# Patient Record
Sex: MALE | Race: BLACK OR AFRICAN AMERICAN | NOT HISPANIC OR LATINO | Employment: UNEMPLOYED | RURAL
[De-identification: names, ages, dates, MRNs, and addresses within clinical notes are randomized per-mention and may not be internally consistent; named-entity substitution may affect disease eponyms.]

---

## 2020-09-01 ENCOUNTER — HISTORICAL (OUTPATIENT)
Dept: ADMINISTRATIVE | Facility: HOSPITAL | Age: 65
End: 2020-09-01

## 2020-11-18 ENCOUNTER — HISTORICAL (OUTPATIENT)
Dept: ADMINISTRATIVE | Facility: HOSPITAL | Age: 65
End: 2020-11-18

## 2020-11-18 LAB
ALBUMIN SERPL BCP-MCNC: 3.9 G/DL (ref 3.5–5)
ALBUMIN/GLOB SERPL: 1.1 {RATIO}
ALP SERPL-CCNC: 47 U/L (ref 45–115)
ALT SERPL W P-5'-P-CCNC: 71 U/L (ref 16–61)
ANION GAP SERPL CALCULATED.3IONS-SCNC: 8.9 MMOL/L (ref 7–16)
AST SERPL W P-5'-P-CCNC: 40 U/L (ref 15–37)
BILIRUB SERPL-MCNC: 0.5 MG/DL (ref 0–1.2)
BUN SERPL-MCNC: 19 MG/DL (ref 7–18)
BUN/CREAT SERPL: 14
CALCIUM SERPL-MCNC: 10 MG/DL (ref 8.5–10.1)
CHLORIDE SERPL-SCNC: 108 MMOL/L (ref 98–107)
CO2 SERPL-SCNC: 28 MMOL/L (ref 21–32)
CREAT SERPL-MCNC: 1.32 MG/DL (ref 0.7–1.3)
GLOBULIN SER-MCNC: 3.5 G/DL (ref 2–4)
GLUCOSE SERPL-MCNC: 81 MG/DL (ref 74–106)
POTASSIUM SERPL-SCNC: 3.9 MMOL/L (ref 3.5–5.1)
PROT SERPL-MCNC: 7.4 G/DL (ref 6.4–8.2)
SODIUM SERPL-SCNC: 141 MMOL/L (ref 136–145)

## 2021-01-11 ENCOUNTER — HISTORICAL (OUTPATIENT)
Dept: ADMINISTRATIVE | Facility: HOSPITAL | Age: 66
End: 2021-01-11

## 2021-01-11 LAB
ALBUMIN SERPL BCP-MCNC: 3.8 G/DL (ref 3.5–5)
ALBUMIN/GLOB SERPL: 1.1 {RATIO}
ALP SERPL-CCNC: 49 U/L (ref 45–115)
ALT SERPL W P-5'-P-CCNC: 59 U/L (ref 16–61)
ANION GAP SERPL CALCULATED.3IONS-SCNC: 9.7 MMOL/L (ref 7–16)
AST SERPL W P-5'-P-CCNC: 37 U/L (ref 15–37)
BASOPHILS # BLD AUTO: 0.11 X10E3/UL (ref 0–0.2)
BASOPHILS NFR BLD AUTO: 2 % (ref 0–1)
BILIRUB SERPL-MCNC: 0.4 MG/DL (ref 0–1.2)
BUN SERPL-MCNC: 17 MG/DL (ref 7–18)
BUN/CREAT SERPL: 13
CALCIUM SERPL-MCNC: 9.5 MG/DL (ref 8.5–10.1)
CHLORIDE SERPL-SCNC: 109 MMOL/L (ref 98–107)
CHOLEST SERPL-MCNC: 159 MG/DL
CHOLEST/HDLC SERPL: 2.9 {RATIO}
CO2 SERPL-SCNC: 26 MMOL/L (ref 21–32)
CREAT SERPL-MCNC: 1.32 MG/DL (ref 0.7–1.3)
EOSINOPHIL # BLD AUTO: 0.72 X10E3/UL (ref 0–0.5)
EOSINOPHIL NFR BLD AUTO: 13 % (ref 1–4)
ERYTHROCYTE [DISTWIDTH] IN BLOOD BY AUTOMATED COUNT: 14 % (ref 11.5–14.5)
GLOBULIN SER-MCNC: 3.5 G/DL (ref 2–4)
GLUCOSE SERPL-MCNC: 88 MG/DL (ref 74–106)
HCT VFR BLD AUTO: 38.6 % (ref 40–54)
HDLC SERPL-MCNC: 54 MG/DL
HGB BLD-MCNC: 12.8 G/DL (ref 13.5–18)
IMM GRANULOCYTES # BLD AUTO: 0.01 X10E3/UL (ref 0–0.04)
IMM GRANULOCYTES NFR BLD: 0.2 % (ref 0–0.4)
LDLC SERPL CALC-MCNC: 93 MG/DL
LYMPHOCYTES # BLD AUTO: 1.57 X10E3/UL (ref 1–4.8)
LYMPHOCYTES NFR BLD AUTO: 28.4 % (ref 27–41)
MCH RBC QN AUTO: 29.8 PG (ref 27–31)
MCHC RBC AUTO-ENTMCNC: 33.2 G/DL (ref 32–36)
MCV RBC AUTO: 89.8 FL (ref 80–96)
MONOCYTES # BLD AUTO: 0.53 X10E3/UL (ref 0–0.8)
MONOCYTES NFR BLD AUTO: 9.6 % (ref 2–6)
MPC BLD CALC-MCNC: 12 FL (ref 9.4–12.4)
NEUTROPHILS # BLD AUTO: 2.58 X10E3/UL (ref 1.8–7.7)
NEUTROPHILS NFR BLD AUTO: 46.8 % (ref 53–65)
NRBC # BLD AUTO: 0 X10E3/UL (ref 0–0)
NRBC, AUTO (.00): 0 /100 (ref 0–0)
PLATELET # BLD AUTO: 262 X10E3/UL (ref 150–400)
POTASSIUM SERPL-SCNC: 3.7 MMOL/L (ref 3.5–5.1)
PROT SERPL-MCNC: 7.3 G/DL (ref 6.4–8.2)
RBC # BLD AUTO: 4.3 X10E6/UL (ref 4.6–6.2)
SODIUM SERPL-SCNC: 141 MMOL/L (ref 136–145)
TRIGL SERPL-MCNC: 60 MG/DL
TSH SERPL DL<=0.005 MIU/L-ACNC: 1.22 UIU/ML (ref 0.36–3.74)
WBC # BLD AUTO: 5.52 X10E3/UL (ref 4.5–11)

## 2021-03-09 ENCOUNTER — HISTORICAL (OUTPATIENT)
Dept: ADMINISTRATIVE | Facility: HOSPITAL | Age: 66
End: 2021-03-09

## 2021-03-09 LAB
ANION GAP SERPL CALCULATED.3IONS-SCNC: 9 MMOL/L (ref 7–16)
BUN SERPL-MCNC: 16 MG/DL (ref 7–18)
CALCIUM SERPL-MCNC: 9.7 MG/DL (ref 8.5–10.1)
CHLORIDE SERPL-SCNC: 108 MMOL/L (ref 98–107)
CO2 SERPL-SCNC: 28 MMOL/L (ref 21–32)
CREAT SERPL-MCNC: 1.39 MG/DL (ref 0.7–1.3)
GLUCOSE SERPL-MCNC: 86 MG/DL (ref 74–106)
POTASSIUM SERPL-SCNC: 3.8 MMOL/L (ref 3.5–5.1)
SODIUM SERPL-SCNC: 141 MMOL/L (ref 136–145)

## 2022-05-11 DIAGNOSIS — Z12.11 COLON CANCER SCREENING: Primary | ICD-10-CM

## 2022-06-23 ENCOUNTER — ANESTHESIA EVENT (OUTPATIENT)
Dept: GASTROENTEROLOGY | Facility: HOSPITAL | Age: 67
End: 2022-06-23
Payer: MEDICARE

## 2022-06-23 ENCOUNTER — HOSPITAL ENCOUNTER (OUTPATIENT)
Dept: GASTROENTEROLOGY | Facility: HOSPITAL | Age: 67
Discharge: HOME | End: 2022-06-23
Attending: INTERNAL MEDICINE
Payer: MEDICARE

## 2022-06-23 ENCOUNTER — ANESTHESIA (OUTPATIENT)
Dept: GASTROENTEROLOGY | Facility: HOSPITAL | Age: 67
End: 2022-06-23
Payer: MEDICARE

## 2022-06-23 VITALS
TEMPERATURE: 98 F | HEART RATE: 43 BPM | RESPIRATION RATE: 18 BRPM | SYSTOLIC BLOOD PRESSURE: 103 MMHG | BODY MASS INDEX: 29.23 KG/M2 | HEIGHT: 63 IN | OXYGEN SATURATION: 98 % | DIASTOLIC BLOOD PRESSURE: 50 MMHG | WEIGHT: 165 LBS

## 2022-06-23 DIAGNOSIS — Z86.010 HISTORY OF COLON POLYPS: ICD-10-CM

## 2022-06-23 DIAGNOSIS — Z12.11 COLON CANCER SCREENING: ICD-10-CM

## 2022-06-23 DIAGNOSIS — Z12.11 SCREENING FOR COLON CANCER: ICD-10-CM

## 2022-06-23 DIAGNOSIS — K62.1 POLYP OF RECTUM: ICD-10-CM

## 2022-06-23 DIAGNOSIS — K57.30 COLON, DIVERTICULOSIS: ICD-10-CM

## 2022-06-23 DIAGNOSIS — D12.3 ADENOMATOUS POLYP OF TRANSVERSE COLON: ICD-10-CM

## 2022-06-23 PROBLEM — Z86.0100 HISTORY OF COLON POLYPS: Status: ACTIVE | Noted: 2022-06-23

## 2022-06-23 PROCEDURE — 45380 COLONOSCOPY AND BIOPSY: CPT | Mod: PT

## 2022-06-23 PROCEDURE — 25000003 PHARM REV CODE 250: Performed by: NURSE ANESTHETIST, CERTIFIED REGISTERED

## 2022-06-23 PROCEDURE — 37000009 HC ANESTHESIA EA ADD 15 MINS

## 2022-06-23 PROCEDURE — C1889 IMPLANT/INSERT DEVICE, NOC: HCPCS

## 2022-06-23 PROCEDURE — D9220A PRA ANESTHESIA: ICD-10-PCS | Mod: PT,,, | Performed by: NURSE ANESTHETIST, CERTIFIED REGISTERED

## 2022-06-23 PROCEDURE — D9220A PRA ANESTHESIA: Mod: PT,,, | Performed by: NURSE ANESTHETIST, CERTIFIED REGISTERED

## 2022-06-23 PROCEDURE — 37000008 HC ANESTHESIA 1ST 15 MINUTES

## 2022-06-23 PROCEDURE — 27201423 OPTIME MED/SURG SUP & DEVICES STERILE SUPPLY

## 2022-06-23 PROCEDURE — 63600175 PHARM REV CODE 636 W HCPCS: Performed by: NURSE ANESTHETIST, CERTIFIED REGISTERED

## 2022-06-23 RX ORDER — LOSARTAN POTASSIUM AND HYDROCHLOROTHIAZIDE 12.5; 1 MG/1; MG/1
1 TABLET ORAL DAILY
COMMUNITY

## 2022-06-23 RX ORDER — PROPOFOL 10 MG/ML
VIAL (ML) INTRAVENOUS
Status: DISCONTINUED | OUTPATIENT
Start: 2022-06-23 | End: 2022-06-23

## 2022-06-23 RX ORDER — LIDOCAINE HYDROCHLORIDE 20 MG/ML
INJECTION, SOLUTION EPIDURAL; INFILTRATION; INTRACAUDAL; PERINEURAL
Status: DISCONTINUED | OUTPATIENT
Start: 2022-06-23 | End: 2022-06-23

## 2022-06-23 RX ORDER — ATORVASTATIN CALCIUM 40 MG/1
40 TABLET, FILM COATED ORAL DAILY
COMMUNITY

## 2022-06-23 RX ORDER — OMEPRAZOLE 40 MG/1
40 CAPSULE, DELAYED RELEASE ORAL DAILY
COMMUNITY

## 2022-06-23 RX ORDER — FENOFIBRATE 160 MG/1
145 TABLET ORAL DAILY
COMMUNITY

## 2022-06-23 RX ORDER — PHENYLEPHRINE HYDROCHLORIDE 10 MG/ML
INJECTION INTRAVENOUS
Status: DISCONTINUED | OUTPATIENT
Start: 2022-06-23 | End: 2022-06-23

## 2022-06-23 RX ORDER — SODIUM CHLORIDE 0.9 % (FLUSH) 0.9 %
10 SYRINGE (ML) INJECTION
Status: DISCONTINUED | OUTPATIENT
Start: 2022-06-23 | End: 2022-06-24 | Stop reason: HOSPADM

## 2022-06-23 RX ORDER — ATENOLOL 50 MG/1
50 TABLET ORAL DAILY
COMMUNITY

## 2022-06-23 RX ADMIN — PROPOFOL 50 MG: 10 INJECTION, EMULSION INTRAVENOUS at 10:06

## 2022-06-23 RX ADMIN — PROPOFOL 30 MG: 10 INJECTION, EMULSION INTRAVENOUS at 10:06

## 2022-06-23 RX ADMIN — PROPOFOL 20 MG: 10 INJECTION, EMULSION INTRAVENOUS at 10:06

## 2022-06-23 RX ADMIN — SODIUM CHLORIDE: 9 INJECTION, SOLUTION INTRAVENOUS at 10:06

## 2022-06-23 RX ADMIN — LIDOCAINE HYDROCHLORIDE 100 MG: 20 INJECTION, SOLUTION EPIDURAL; INFILTRATION; INTRACAUDAL; PERINEURAL at 10:06

## 2022-06-23 RX ADMIN — PHENYLEPHRINE HYDROCHLORIDE 100 MCG: 10 INJECTION INTRAVENOUS at 10:06

## 2022-06-23 NOTE — H&P
[facility]  Gastroenterology  H&P    Patient Name: Vickey Dobbs  MRN: [id number]  Admission Date: 6/23/2022  Code Status: No Order    Attending Provider: Sky Hicks MD  Primary Care Physician: Troy Guerrero MD  Principal Problem:<principal problem not specified>    Subjective:     History of Present Illness: Pt has history of colon polyps. His last colonoscopy was 5 years ago.    Past Medical History:   Diagnosis Date    Arthritis     Cancer     GERD (gastroesophageal reflux disease)     High cholesterol     Hypertension     Prostate cancer        Past Surgical History:   Procedure Laterality Date    PROSTATE SURGERY         Review of patient's allergies indicates:  No Known Allergies  Family History    None       Tobacco Use    Smoking status: Never Smoker    Smokeless tobacco: Current User     Types: Chew   Substance and Sexual Activity    Alcohol use: Never    Drug use: Never    Sexual activity: Not on file     Review of Systems   Respiratory: Negative.    Cardiovascular: Negative.    Gastrointestinal: Negative.      Objective:     Vital Signs (Most Recent):  Temp: 97.5 °F (36.4 °C) (06/23/22 0959)  Pulse: (!) 53 (06/23/22 0959)  Resp: 16 (06/23/22 0959)  BP: 104/60 (06/23/22 0959)  SpO2: 98 % (06/23/22 0959) Vital Signs (24h Range):  Temp:  [97.5 °F (36.4 °C)] 97.5 °F (36.4 °C)  Pulse:  [53] 53  Resp:  [16] 16  SpO2:  [98 %] 98 %  BP: (104)/(60) 104/60     Weight: 74.8 kg (165 lb) (06/23/22 0959)  Body mass index is 29.23 kg/m².    No intake or output data in the 24 hours ending 06/23/22 1039    Lines/Drains/Airways     Peripheral Intravenous Line  Duration                Peripheral IV - Single Lumen 06/23/22 0958 22 G Left Hand <1 day                Physical Exam  Constitutional:       General: He is not in acute distress.     Appearance: Normal appearance. He is well-developed. He is not ill-appearing.   HENT:      Head: Normocephalic and atraumatic.      Nose: Nose normal.    Eyes:      Pupils: Pupils are equal, round, and reactive to light.   Cardiovascular:      Rate and Rhythm: Regular rhythm. Bradycardia present.   Pulmonary:      Effort: Pulmonary effort is normal.      Breath sounds: Normal breath sounds. No wheezing.   Abdominal:      General: Abdomen is flat. Bowel sounds are normal. There is no distension.      Palpations: Abdomen is soft.      Tenderness: There is no abdominal tenderness. There is no guarding.   Skin:     General: Skin is warm and dry.      Coloration: Skin is not jaundiced.   Neurological:      Mental Status: He is alert.   Psychiatric:         Attention and Perception: Attention normal.         Mood and Affect: Affect normal.         Speech: Speech normal.         Behavior: Behavior is cooperative.      Comments: Pt was calm while speaking.         Significant Labs:  CBC: No results for input(s): WBC, HGB, HCT, PLT in the last 48 hours.  CMP: No results for input(s): GLU, CALCIUM, ALBUMIN, PROT, NA, K, CO2, CL, BUN, CREATININE, ALKPHOS, ALT, AST, BILITOT in the last 48 hours.    Significant Imaging:  Imaging results within the past 24 hours have been reviewed.    Assessment/Plan:     There are no hospital problems to display for this patient.        Imp: History of colon polyp  Plan: colonoscopy    Sky Hicks MD  Gastroenterology  [facility]

## 2022-06-23 NOTE — ANESTHESIA POSTPROCEDURE EVALUATION
Anesthesia Post Evaluation    Patient: Vickey Dobbs    Procedure(s) Performed:Colonoscopy    Final Anesthesia Type: general      Patient location during evaluation: GI PACU  Patient participation: Yes- Able to Participate  Level of consciousness: awake and alert  Post-procedure vital signs: reviewed and stable  Pain management: adequate  Airway patency: patent  KAREN mitigation strategies: Multimodal analgesia  PONV status at discharge: No PONV  Anesthetic complications: no      Cardiovascular status: blood pressure returned to baseline  Respiratory status: spontaneous ventilation  Hydration status: euvolemic  Follow-up not needed.          Vitals Value Taken Time   /50 06/23/22 1130   Temp 98.2 06/23/22 1136   Pulse 43 06/23/22 1130   Resp 18 06/23/22 1130   SpO2 98 % 06/23/22 1130         No case tracking events are documented in the log.      Pain/Zeus Score: Zeus Score: 10 (6/23/2022 11:30 AM)

## 2022-06-23 NOTE — ANESTHESIA PREPROCEDURE EVALUATION
06/23/2022  Vickey Dobbs is a 66 y.o., male.      Pre-op Assessment    I have reviewed the Patient Summary Reports.     I have reviewed the Nursing Notes. I have reviewed the NPO Status.      Review of Systems  Anesthesia Hx:  No problems with previous Anesthesia  History of prior surgery of interest to airway management or planning: Denies Family Hx of Anesthesia complications.   Denies Personal Hx of Anesthesia complications.   Social:  Non-Smoker + Smokeless tobacco      Hematology/Oncology:        Current/Recent Cancer. Oncology Comments: Prostate per Epic. Patient denies   Cardiovascular:   Hypertension hyperlipidemia    Hepatic/GI:   Bowel Prep. GERD    Musculoskeletal:   Arthritis         Physical Exam    Airway:  Mallampati: III / II  Mouth Opening: Normal  TM Distance: Normal  Tongue: Normal  Neck ROM: Normal ROM        Anesthesia Plan  Type of Anesthesia, risks & benefits discussed:    Anesthesia Type: Gen Natural Airway  Intra-op Monitoring Plan: Standard ASA Monitors  Post Op Pain Control Plan: multimodal analgesia  Informed Consent: Informed consent signed with the Patient and all parties understand the risks and agree with anesthesia plan.  All questions answered. Patient consented to blood products? Yes  ASA Score: 3  Day of Surgery Review of History & Physical: I have interviewed and examined the patient. I have reviewed the patient's H&P dated: There are no significant changes.     Ready For Surgery From Anesthesia Perspective.     .    Past Medical History:   Diagnosis Date    Arthritis     Cancer     GERD (gastroesophageal reflux disease)     High cholesterol     Hypertension     Prostate cancer        Past Surgical History:   Procedure Laterality Date    PROSTATE SURGERY         History reviewed. No pertinent family history.    Social History     Socioeconomic History     Marital status:    Tobacco Use    Smoking status: Never Smoker    Smokeless tobacco: Current User     Types: Chew   Substance and Sexual Activity    Alcohol use: Never    Drug use: Never       Current Outpatient Medications   Medication Sig Dispense Refill    atenoloL (TENORMIN) 50 MG tablet Take 50 mg by mouth once daily.      atorvastatin (LIPITOR) 40 MG tablet Take 40 mg by mouth once daily.      fenofibrate 160 MG Tab Take 145 mg by mouth once daily.      losartan-hydrochlorothiazide 100-12.5 mg (HYZAAR) 100-12.5 mg Tab Take 1 tablet by mouth once daily.      omeprazole (PRILOSEC) 40 MG capsule Take 40 mg by mouth once daily.       No current facility-administered medications for this encounter.       Review of patient's allergies indicates:  No Known Allergies

## 2022-06-23 NOTE — TRANSFER OF CARE
"Anesthesia Transfer of Care Note    Patient: Vickey Dobbs    Procedure(s) Performed: Colonoscopy    Patient location: GI    Anesthesia Type: general    Transport from OR: Transported from OR on room air with adequate spontaneous ventilation    Post pain: adequate analgesia    Post assessment: no apparent anesthetic complications    Post vital signs: stable    Level of consciousness: awake and alert    Nausea/Vomiting: no nausea/vomiting    Complications: none    Transfer of care protocol was followed      Last vitals:   Visit Vitals  BP (!) 86/45   Pulse (!) 46   Temp 36.7 °C (98 °F)   Resp 15   Ht 5' 3" (1.6 m)   Wt 74.8 kg (165 lb)   SpO2 100%   BMI 29.23 kg/m²     "

## 2022-06-23 NOTE — DISCHARGE INSTRUCTIONS
Procedure Date  6/23/22     Impression  Overall Impression: Scattered diverticula were noted throughout the colon. One diminutive polyp was removed with biopsy from the transverse colon and two were removed with biopsy from the rectum. Mildly inflamed internal hemorrhoids were present without bleeding.     Recommendation  Await pathology results  Repeat colonoscopy in 3 years; high fiber diet.  NO DRIVING, OPERATING EQUIPMENT, OR SIGNING LEGAL DOCUMENTS FOR 24 HOURS. THE NURSE WILL CALL YOU WITH YOUR BIOPSY RESULTS IN A FEW DAYS.

## 2022-06-24 LAB
ESTROGEN SERPL-MCNC: NORMAL PG/ML
INSULIN SERPL-ACNC: NORMAL U[IU]/ML
LAB AP GROSS DESCRIPTION: NORMAL
LAB AP LABORATORY NOTES: NORMAL
T3RU NFR SERPL: NORMAL %

## 2022-06-30 ENCOUNTER — TELEPHONE (OUTPATIENT)
Dept: GASTROENTEROLOGY | Facility: CLINIC | Age: 67
End: 2022-06-30
Payer: MEDICARE

## 2022-06-30 NOTE — TELEPHONE ENCOUNTER
Called patient to discuss results and verbalized understanding.      ----- Message from Sky Hicks MD sent at 6/24/2022  4:48 PM CDT -----  Place pt on list for colonoscopy in 3 years; polyps were ta's.

## 2023-10-19 DIAGNOSIS — M25.562 LEFT KNEE PAIN, UNSPECIFIED CHRONICITY: Primary | ICD-10-CM

## 2023-10-23 ENCOUNTER — HOSPITAL ENCOUNTER (OUTPATIENT)
Dept: RADIOLOGY | Facility: HOSPITAL | Age: 68
Discharge: HOME | End: 2023-10-23
Attending: ORTHOPAEDIC SURGERY
Payer: MEDICARE

## 2023-10-23 ENCOUNTER — OFFICE VISIT (OUTPATIENT)
Dept: ORTHOPEDICS | Facility: CLINIC | Age: 68
End: 2023-10-23
Payer: MEDICARE

## 2023-10-23 DIAGNOSIS — M25.562 LEFT KNEE PAIN, UNSPECIFIED CHRONICITY: ICD-10-CM

## 2023-10-23 DIAGNOSIS — M25.562 LEFT KNEE PAIN, UNSPECIFIED CHRONICITY: Primary | ICD-10-CM

## 2023-10-23 PROCEDURE — 73562 XR KNEE AP LAT WITH SUNRISE LEFT: ICD-10-PCS | Mod: 26,LT,, | Performed by: ORTHOPAEDIC SURGERY

## 2023-10-23 PROCEDURE — 99213 OFFICE O/P EST LOW 20 MIN: CPT | Mod: PBBFAC | Performed by: ORTHOPAEDIC SURGERY

## 2023-10-23 PROCEDURE — 99204 OFFICE O/P NEW MOD 45 MIN: CPT | Mod: S$PBB,,, | Performed by: ORTHOPAEDIC SURGERY

## 2023-10-23 PROCEDURE — 73562 X-RAY EXAM OF KNEE 3: CPT | Mod: TC,LT

## 2023-10-23 PROCEDURE — 99204 PR OFFICE/OUTPT VISIT, NEW, LEVL IV, 45-59 MIN: ICD-10-PCS | Mod: S$PBB,,, | Performed by: ORTHOPAEDIC SURGERY

## 2023-10-23 PROCEDURE — 73562 X-RAY EXAM OF KNEE 3: CPT | Mod: 26,LT,, | Performed by: ORTHOPAEDIC SURGERY

## 2023-10-23 NOTE — PROGRESS NOTES
CLINIC NOTE       Chief Complaint   Patient presents with    Left Knee - Pain        Vickey Dobbs is a 68 y.o. male seen today for evaluation of left knee pain.  Symptoms began acutely approximately 1 month ago.  No history of trauma.  He localizes pain to the medial joint line.  He is not on any blood thinners except for a daily aspirin.  He ambulates independently.  He is never had an MRI of his left knee nor a corticosteroid injection.    Past Medical History:   Diagnosis Date    Adenomatous polyp of transverse colon 6/23/2022    Arthritis     Cancer     Colon, diverticulosis 6/23/2022    GERD (gastroesophageal reflux disease)     High cholesterol     History of colon polyps 6/23/2022    Hypertension     Polyp of rectum 6/23/2022    Prostate cancer     Screening for colon cancer 6/23/2022     History reviewed. No pertinent family history.  Current Outpatient Medications on File Prior to Visit   Medication Sig Dispense Refill    atenoloL (TENORMIN) 50 MG tablet Take 50 mg by mouth once daily.      atorvastatin (LIPITOR) 40 MG tablet Take 40 mg by mouth once daily.      fenofibrate 160 MG Tab Take 145 mg by mouth once daily.      losartan-hydrochlorothiazide 100-12.5 mg (HYZAAR) 100-12.5 mg Tab Take 1 tablet by mouth once daily.      omeprazole (PRILOSEC) 40 MG capsule Take 40 mg by mouth once daily.       No current facility-administered medications on file prior to visit.       ROS     There were no vitals filed for this visit.    Past Surgical History:   Procedure Laterality Date    PROSTATE SURGERY          Review of patient's allergies indicates:  No Known Allergies     Ortho Exam well-developed well-nourished black male no acute distress.  Alert oriented cooperative.  Neck is supple without JVD.  Breathing is regular nonlabored.  Skin is warm dry no lesions seen.  Exam left knee shows no sign of significant intra-articular effusion.  Knee range of motion is 0-130 degrees of flexion.  Knee  ligaments stable clinically.  There is tenderness palpation over the medial joint line region.  Earnest test produces pain over the medial aspect of the joint without jac popping.  Motor and sensory function intact left lower extremity distal pulses well maintained.    Radiographic Examination:  X-rays left knee 10/23/2023    Technique:  Three views AP lateral and patellar    Findings:  Bones well mineralized.  Joint spaces are generally well maintained with no evidence of fracture, dislocation, pathologic bone or significant DJD.    Impression:   See Above    Assessment and Plan  Patient Active Problem List    Diagnosis Date Noted    Screening for colon cancer 06/23/2022    History of colon polyps 06/23/2022    Colon, diverticulosis 06/23/2022    Adenomatous polyp of transverse colon 06/23/2022    Polyp of rectum 06/23/2022    Impression:  Rule out medial meniscal tear-left knee  Plan:  1. Rx Mobic 15 mg 1 p.o. q.d. p.r.n. 2. Schedule MRI examination left knee recheck with results.      Alexx Griffith M.D.

## 2023-11-03 ENCOUNTER — HOSPITAL ENCOUNTER (OUTPATIENT)
Dept: RADIOLOGY | Facility: HOSPITAL | Age: 68
Discharge: HOME | End: 2023-11-03
Attending: ORTHOPAEDIC SURGERY
Payer: MEDICARE

## 2023-11-03 DIAGNOSIS — M25.562 LEFT KNEE PAIN, UNSPECIFIED CHRONICITY: ICD-10-CM

## 2023-11-03 PROCEDURE — 73721 MRI JNT OF LWR EXTRE W/O DYE: CPT | Mod: TC,LT

## 2023-11-03 PROCEDURE — 73721 MRI JNT OF LWR EXTRE W/O DYE: CPT | Mod: 26,LT,, | Performed by: RADIOLOGY

## 2023-11-03 PROCEDURE — 73721 MRI KNEE WITHOUT CONTRAST LEFT: ICD-10-PCS | Mod: 26,LT,, | Performed by: RADIOLOGY

## 2023-11-27 ENCOUNTER — OFFICE VISIT (OUTPATIENT)
Dept: ORTHOPEDICS | Facility: CLINIC | Age: 68
End: 2023-11-27
Payer: MEDICARE

## 2023-11-27 DIAGNOSIS — M25.562 LEFT KNEE PAIN, UNSPECIFIED CHRONICITY: Primary | ICD-10-CM

## 2023-11-27 DIAGNOSIS — S83.242A ACUTE MEDIAL MENISCUS TEAR OF LEFT KNEE, INITIAL ENCOUNTER: ICD-10-CM

## 2023-11-27 PROCEDURE — 99214 OFFICE O/P EST MOD 30 MIN: CPT | Mod: S$PBB,25,, | Performed by: ORTHOPAEDIC SURGERY

## 2023-11-27 PROCEDURE — 99214 PR OFFICE/OUTPT VISIT, EST, LEVL IV, 30-39 MIN: ICD-10-PCS | Mod: S$PBB,25,, | Performed by: ORTHOPAEDIC SURGERY

## 2023-11-27 PROCEDURE — 99999PBSHW PR PBB SHADOW TECHNICAL ONLY FILED TO HB: ICD-10-PCS | Mod: PBBFAC,,,

## 2023-11-27 PROCEDURE — 20610 DRAIN/INJ JOINT/BURSA W/O US: CPT | Mod: PBBFAC | Performed by: ORTHOPAEDIC SURGERY

## 2023-11-27 PROCEDURE — 99213 OFFICE O/P EST LOW 20 MIN: CPT | Mod: PBBFAC,25 | Performed by: ORTHOPAEDIC SURGERY

## 2023-11-27 PROCEDURE — 99999PBSHW PR PBB SHADOW TECHNICAL ONLY FILED TO HB: Mod: PBBFAC,,,

## 2023-11-27 PROCEDURE — 20610 DRAIN/INJ JOINT/BURSA W/O US: CPT | Mod: S$PBB,LT,, | Performed by: ORTHOPAEDIC SURGERY

## 2023-11-27 PROCEDURE — 20610 PR DRAIN/INJECT LARGE JOINT/BURSA: ICD-10-PCS | Mod: S$PBB,LT,, | Performed by: ORTHOPAEDIC SURGERY

## 2023-11-27 RX ORDER — BUPIVACAINE HYDROCHLORIDE 2.5 MG/ML
1 INJECTION, SOLUTION INFILTRATION; PERINEURAL
Status: COMPLETED | OUTPATIENT
Start: 2023-11-27 | End: 2023-11-27

## 2023-11-27 RX ORDER — TRIAMCINOLONE ACETONIDE 40 MG/ML
40 INJECTION, SUSPENSION INTRA-ARTICULAR; INTRAMUSCULAR
Status: COMPLETED | OUTPATIENT
Start: 2023-11-27 | End: 2023-11-27

## 2023-11-27 RX ADMIN — TRIAMCINOLONE ACETONIDE 40 MG: 40 INJECTION, SUSPENSION INTRA-ARTICULAR; INTRAMUSCULAR at 02:11

## 2023-11-27 RX ADMIN — BUPIVACAINE HYDROCHLORIDE 2.5 MG: 2.5 INJECTION, SOLUTION INFILTRATION; PERINEURAL at 02:11

## 2023-11-27 NOTE — PROGRESS NOTES
CLINIC NOTE       Chief Complaint   Patient presents with    Results        Vickey Dobbs is a 68 y.o. male seen today for recheck of his left knee.  Continues to be symptomatic with medial knee pain.  He underwent MRI examination of his left knee 11/23/2023.  The MRI demonstrates tear of the posterior horn of the medial meniscus.  We discussed the option for arthroscopic surgical intervention.  Procedure was discussed in details.  Discussed outpatient anesthesia of choice.  The potential benefits risks surgery outlined to include but not limited to bleeding, infection, damage to blood vessels and nerves, need for further surgery, other risks and complications including even death the patient wished to proceed in the near future.  He is however scheduled for right foot surgery with a podiatrist in [place] this Friday.  History suggests simple bunionectomy.  She was given a card call for scheduling of left knee arthroscopy when he is sufficiently recovered.  Left knee today was prepped with Betadine intra-articular steroid injection performed with triamcinolone and Marcaine 1 cc each for symptomatic treatment until surgery can be accomplished.    Past Medical History:   Diagnosis Date    Adenomatous polyp of transverse colon 6/23/2022    Arthritis     Cancer     Colon, diverticulosis 6/23/2022    GERD (gastroesophageal reflux disease)     High cholesterol     History of colon polyps 6/23/2022    Hypertension     Polyp of rectum 6/23/2022    Prostate cancer     Screening for colon cancer 6/23/2022     History reviewed. No pertinent family history.  Current Outpatient Medications on File Prior to Visit   Medication Sig Dispense Refill    atenoloL (TENORMIN) 50 MG tablet Take 50 mg by mouth once daily.      atorvastatin (LIPITOR) 40 MG tablet Take 40 mg by mouth once daily.      fenofibrate 160 MG Tab Take 145 mg by mouth once daily.      losartan-hydrochlorothiazide 100-12.5 mg (HYZAAR) 100-12.5 mg Tab  Take 1 tablet by mouth once daily.      omeprazole (PRILOSEC) 40 MG capsule Take 40 mg by mouth once daily.       No current facility-administered medications on file prior to visit.       ROS     There were no vitals filed for this visit.    Past Surgical History:   Procedure Laterality Date    PROSTATE SURGERY          Review of patient's allergies indicates:  No Known Allergies     Ortho Exam     Radiographic Examination:    Technique:    Findings:    Impression:   See Above    Assessment and Plan  Patient Active Problem List    Diagnosis Date Noted    Screening for colon cancer 06/23/2022    History of colon polyps 06/23/2022    Colon, diverticulosis 06/23/2022    Adenomatous polyp of transverse colon 06/23/2022    Polyp of rectum 06/23/2022          Alexx Griffith M.D.

## 2024-04-09 ENCOUNTER — OFFICE VISIT (OUTPATIENT)
Dept: ORTHOPEDICS | Facility: CLINIC | Age: 69
End: 2024-04-09
Payer: MEDICARE

## 2024-04-09 VITALS — BODY MASS INDEX: 29.23 KG/M2 | HEIGHT: 63 IN | WEIGHT: 165 LBS

## 2024-04-09 DIAGNOSIS — S83.242D ACUTE MEDIAL MENISCUS TEAR OF LEFT KNEE, SUBSEQUENT ENCOUNTER: Primary | ICD-10-CM

## 2024-04-09 PROCEDURE — 99999PBSHW PR PBB SHADOW TECHNICAL ONLY FILED TO HB: Mod: JW,PBBFAC,,

## 2024-04-09 PROCEDURE — 99213 OFFICE O/P EST LOW 20 MIN: CPT | Mod: S$PBB,25,,

## 2024-04-09 PROCEDURE — 20610 DRAIN/INJ JOINT/BURSA W/O US: CPT | Mod: PBBFAC,LT

## 2024-04-09 PROCEDURE — 99213 OFFICE O/P EST LOW 20 MIN: CPT | Mod: PBBFAC,25

## 2024-04-09 RX ORDER — TRIAMCINOLONE ACETONIDE 40 MG/ML
40 INJECTION, SUSPENSION INTRA-ARTICULAR; INTRAMUSCULAR
Status: DISCONTINUED | OUTPATIENT
Start: 2024-04-09 | End: 2024-04-09 | Stop reason: HOSPADM

## 2024-04-09 RX ORDER — BUPIVACAINE HYDROCHLORIDE 2.5 MG/ML
1 INJECTION, SOLUTION EPIDURAL; INFILTRATION; INTRACAUDAL
Status: DISCONTINUED | OUTPATIENT
Start: 2024-04-09 | End: 2024-04-09 | Stop reason: HOSPADM

## 2024-04-09 RX ADMIN — TRIAMCINOLONE ACETONIDE 40 MG: 40 INJECTION, SUSPENSION INTRA-ARTICULAR; INTRAMUSCULAR at 12:04

## 2024-04-09 RX ADMIN — BUPIVACAINE HYDROCHLORIDE 1 ML: 2.5 INJECTION, SOLUTION EPIDURAL; INFILTRATION; INTRACAUDAL at 12:04

## 2024-04-09 NOTE — PROCEDURES
Large Joint Aspiration/Injection: L knee    Date/Time: 4/9/2024 12:40 PM    Performed by: Whitney Rothman PA  Authorized by: Whitney Rothman PA    Consent Done?:  Yes (Verbal)  Indications:  Arthritis and pain  Site marked: the procedure site was marked      Details:  Needle Size:  22 G  Approach:  Anterolateral  Location:  Knee  Site:  L knee  Medications:  1 mL BUPivacaine (PF) 0.25% (2.5 mg/ml) 0.25 % (2.5 mg/mL); 40 mg triamcinolone acetonide 40 mg/mL  Patient tolerance:  Patient tolerated the procedure well with no immediate complications

## 2024-04-09 NOTE — PROGRESS NOTES
CC:   Chief Complaint   Patient presents with    Left Knee - Pain          Vickey Dobbs is a 68 y.o. male seen today for follow up of Pain of the Left Knee  Patient is known to Dr. Griffith last seen on 11/27/2023 with left knee pain.  He underwent MRI examination of his left knee prior to last visit that demonstrated a tear in the posterior horn of the medial meniscus.  At that time Dr. Griffith discussed all surgical options with the patient.  Patient had another procedure scheduled in [place] on his foot.  It was determined at that time that he would follow up with Dr. Griffith after his other scheduled operation for scheduling surgery in the left knee.  Patient presents today with continued left knee pain.  He received a steroid injection at his last visit in 11/27 which provided him pain relief up until 2 weeks ago.  Patient presents today hoping to schedule surgery with Dr. Griffith.  He denies any new fall or injury.  No other complaints today.        PAST MEDICAL HISTORY:   Past Medical History:   Diagnosis Date    Adenomatous polyp of transverse colon 6/23/2022    Arthritis     Cancer     Colon, diverticulosis 6/23/2022    GERD (gastroesophageal reflux disease)     High cholesterol     History of colon polyps 6/23/2022    Hypertension     Polyp of rectum 6/23/2022    Prostate cancer     Screening for colon cancer 6/23/2022        PAST SURGICAL HISTORY:   Past Surgical History:   Procedure Laterality Date    PROSTATE SURGERY          ALLERGIES: Review of patient's allergies indicates:  No Known Allergies     MEDICATIONS :    Current Outpatient Medications:     atenoloL (TENORMIN) 50 MG tablet, Take 50 mg by mouth once daily., Disp: , Rfl:     atorvastatin (LIPITOR) 40 MG tablet, Take 40 mg by mouth once daily., Disp: , Rfl:     fenofibrate 160 MG Tab, Take 145 mg by mouth once daily., Disp: , Rfl:     losartan-hydrochlorothiazide 100-12.5 mg (HYZAAR) 100-12.5 mg Tab, Take 1  tablet by mouth once daily., Disp: , Rfl:     omeprazole (PRILOSEC) 40 MG capsule, Take 40 mg by mouth once daily., Disp: , Rfl:      SOCIAL HISTORY:   Social History     Socioeconomic History    Marital status:    Tobacco Use    Smoking status: Never    Smokeless tobacco: Current     Types: Chew   Substance and Sexual Activity    Alcohol use: Never    Drug use: Never        FAMILY HISTORY: History reviewed. No pertinent family history.       PHYSICAL EXAM:      There were no vitals filed for this visit.  Body mass index is 29.23 kg/m².    GENERAL: Well-developed, well-nourished male . The patient is alert, oriented and cooperative.    EXTREMITIES:  Left knee with skin clean dry and intact, tenderness to palpation on medial joint line, range of motion from 0-100 degrees but not without pain, knee is stable to varus and valgus stress testing, neurovascularly intact      RADIOGRAPHIC FINDINGS:   No results found.     Patient Active Problem List    Diagnosis Date Noted    Acute medial meniscus tear of left knee 11/27/2023    Screening for colon cancer 06/23/2022    History of colon polyps 06/23/2022    Colon, diverticulosis 06/23/2022    Adenomatous polyp of transverse colon 06/23/2022    Polyp of rectum 06/23/2022     IMPRESSION AND PLAN:  Left knee pain.  MRI on 11/23/2023 with tear of the posterior horn of the medial meniscus.  Discussed all treatment options with the patient.  Patient is requesting steroid injection of left knee, see procedural note for details.  Patient is scheduled for follow up visit with Dr. Griffith on 04/29 to discuss surgical options.  Otherwise follow up p.r.n..        No follow-ups on file.       Whitney Rothman PA-C      (Subject to voice recognition error, transcription service not allowed)

## 2024-04-29 ENCOUNTER — OFFICE VISIT (OUTPATIENT)
Dept: ORTHOPEDICS | Facility: CLINIC | Age: 69
End: 2024-04-29
Payer: MEDICARE

## 2024-04-29 DIAGNOSIS — S83.242D ACUTE MEDIAL MENISCUS TEAR OF LEFT KNEE, SUBSEQUENT ENCOUNTER: Primary | ICD-10-CM

## 2024-04-29 PROCEDURE — 99213 OFFICE O/P EST LOW 20 MIN: CPT | Mod: PBBFAC | Performed by: ORTHOPAEDIC SURGERY

## 2024-04-29 PROCEDURE — 99214 OFFICE O/P EST MOD 30 MIN: CPT | Mod: S$PBB,,, | Performed by: ORTHOPAEDIC SURGERY

## 2024-04-30 DIAGNOSIS — Z01.818 PRE-PROCEDURAL EXAMINATION: ICD-10-CM

## 2024-04-30 DIAGNOSIS — S83.242D ACUTE MEDIAL MENISCUS TEAR OF LEFT KNEE, SUBSEQUENT ENCOUNTER: Primary | ICD-10-CM

## 2024-04-30 NOTE — PROGRESS NOTES
CLINIC NOTE       Chief Complaint   Patient presents with    Left Knee - Follow-up        Vickey Dobbs is a 68 y.o. male seen today for recheck of his left knee.  Symptoms began acutely in September of 2023.  He developed medial knee pain.  He was seen initially in October of 2023 prior to obtaining an MRI examination.  MRI examination of the left knee was obtained in November of 2023 showing complex tear of the posterior horn of the medial meniscus.  He was offered left knee arthroscopy but was at that time scheduled for foot surgery in [facility].  Delayed consideration of knee arthroscopy in order to proceed with his foot surgery.  She was returning at this time requesting consideration of knee arthroscopy due to persistent medial knee pain.  He continues to ambulate independently however.  We have discussed the knee arthroscopy procedure as an outpatient under general anesthesia.  The potential benefits and risks of surgery were outlined to include but not limited to bleeding infection, damage to blood vessels and nerves, need for further surgery, other risks and complications including even death the patient wished to proceed.  Limitations of arthroscopy relative to potential DJD were emphasized.    Past Medical History:   Diagnosis Date    Adenomatous polyp of transverse colon 6/23/2022    Arthritis     Cancer     Colon, diverticulosis 6/23/2022    GERD (gastroesophageal reflux disease)     High cholesterol     History of colon polyps 6/23/2022    Hypertension     Polyp of rectum 6/23/2022    Prostate cancer     Screening for colon cancer 6/23/2022     No family history on file.  Current Outpatient Medications on File Prior to Visit   Medication Sig Dispense Refill    atenoloL (TENORMIN) 50 MG tablet Take 50 mg by mouth once daily.      atorvastatin (LIPITOR) 40 MG tablet Take 40 mg by mouth once daily.      fenofibrate 160 MG Tab Take 145 mg by mouth once daily.       losartan-hydrochlorothiazide 100-12.5 mg (HYZAAR) 100-12.5 mg Tab Take 1 tablet by mouth once daily.      omeprazole (PRILOSEC) 40 MG capsule Take 40 mg by mouth once daily.       No current facility-administered medications on file prior to visit.       ROS     There were no vitals filed for this visit.    Past Surgical History:   Procedure Laterality Date    PROSTATE SURGERY          Review of patient's allergies indicates:  No Known Allergies     Ortho Exam     Radiographic Examination:  Left knee 10/23/2023    Technique:  Three views AP lateral and patellar    Findings:  Bones well mineralized.  She was mild to moderate narrowing of the medial and lateral joint spaces.  No evidence of acute fracture, dislocation or pathologic bone.    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  Outpatient procedure.      Alexx Griffith M.D.

## 2024-05-16 ENCOUNTER — HOSPITAL ENCOUNTER (OUTPATIENT)
Facility: HOSPITAL | Age: 69
Discharge: HOME | End: 2024-05-16
Attending: ORTHOPAEDIC SURGERY | Admitting: ORTHOPAEDIC SURGERY
Payer: MEDICARE

## 2024-05-16 ENCOUNTER — ANESTHESIA EVENT (OUTPATIENT)
Dept: SURGERY | Facility: HOSPITAL | Age: 69
End: 2024-05-16
Payer: MEDICARE

## 2024-05-16 ENCOUNTER — ANESTHESIA (OUTPATIENT)
Dept: SURGERY | Facility: HOSPITAL | Age: 69
End: 2024-05-16
Payer: MEDICARE

## 2024-05-16 VITALS
HEIGHT: 63 IN | RESPIRATION RATE: 16 BRPM | DIASTOLIC BLOOD PRESSURE: 63 MMHG | WEIGHT: 164 LBS | BODY MASS INDEX: 29.06 KG/M2 | TEMPERATURE: 98 F | SYSTOLIC BLOOD PRESSURE: 117 MMHG | OXYGEN SATURATION: 100 % | HEART RATE: 42 BPM

## 2024-05-16 DIAGNOSIS — S83.242A ACUTE MEDIAL MENISCUS TEAR OF LEFT KNEE: ICD-10-CM

## 2024-05-16 PROCEDURE — 25000003 PHARM REV CODE 250: Performed by: ORTHOPAEDIC SURGERY

## 2024-05-16 PROCEDURE — 27000177 HC AIRWAY, LARYNGEAL MASK: Performed by: NURSE ANESTHETIST, CERTIFIED REGISTERED

## 2024-05-16 PROCEDURE — 37000008 HC ANESTHESIA 1ST 15 MINUTES: Performed by: ORTHOPAEDIC SURGERY

## 2024-05-16 PROCEDURE — D9220A PRA ANESTHESIA: Mod: CRNA,,, | Performed by: NURSE ANESTHETIST, CERTIFIED REGISTERED

## 2024-05-16 PROCEDURE — 36000710: Performed by: ORTHOPAEDIC SURGERY

## 2024-05-16 PROCEDURE — 71000015 HC POSTOP RECOV 1ST HR: Performed by: ORTHOPAEDIC SURGERY

## 2024-05-16 PROCEDURE — 37000009 HC ANESTHESIA EA ADD 15 MINS: Performed by: ORTHOPAEDIC SURGERY

## 2024-05-16 PROCEDURE — D9220A PRA ANESTHESIA: Mod: ANES,,, | Performed by: ANESTHESIOLOGY

## 2024-05-16 PROCEDURE — 29881 ARTHRS KNE SRG MNISECTMY M/L: CPT | Mod: LT,,, | Performed by: ORTHOPAEDIC SURGERY

## 2024-05-16 PROCEDURE — 27201423 OPTIME MED/SURG SUP & DEVICES STERILE SUPPLY: Performed by: ORTHOPAEDIC SURGERY

## 2024-05-16 PROCEDURE — 36000711: Performed by: ORTHOPAEDIC SURGERY

## 2024-05-16 PROCEDURE — 27000510 HC BLANKET BAIR HUGGER ANY SIZE: Performed by: NURSE ANESTHETIST, CERTIFIED REGISTERED

## 2024-05-16 PROCEDURE — 63600175 PHARM REV CODE 636 W HCPCS: Performed by: ORTHOPAEDIC SURGERY

## 2024-05-16 PROCEDURE — 97161 PT EVAL LOW COMPLEX 20 MIN: CPT

## 2024-05-16 PROCEDURE — 25000003 PHARM REV CODE 250: Performed by: NURSE ANESTHETIST, CERTIFIED REGISTERED

## 2024-05-16 PROCEDURE — 27000655: Performed by: NURSE ANESTHETIST, CERTIFIED REGISTERED

## 2024-05-16 PROCEDURE — 27000716 HC OXISENSOR PROBE, ANY SIZE: Performed by: NURSE ANESTHETIST, CERTIFIED REGISTERED

## 2024-05-16 PROCEDURE — 71000033 HC RECOVERY, INTIAL HOUR: Performed by: ORTHOPAEDIC SURGERY

## 2024-05-16 PROCEDURE — 71000016 HC POSTOP RECOV ADDL HR: Performed by: ORTHOPAEDIC SURGERY

## 2024-05-16 PROCEDURE — 63600175 PHARM REV CODE 636 W HCPCS: Performed by: NURSE ANESTHETIST, CERTIFIED REGISTERED

## 2024-05-16 RX ORDER — ONDANSETRON HYDROCHLORIDE 2 MG/ML
4 INJECTION, SOLUTION INTRAVENOUS DAILY PRN
Status: DISCONTINUED | OUTPATIENT
Start: 2024-05-16 | End: 2024-05-16 | Stop reason: HOSPADM

## 2024-05-16 RX ORDER — EPHEDRINE SULFATE 50 MG/ML
INJECTION, SOLUTION INTRAVENOUS
Status: DISCONTINUED | OUTPATIENT
Start: 2024-05-16 | End: 2024-05-16

## 2024-05-16 RX ORDER — MORPHINE SULFATE 10 MG/ML
4 INJECTION INTRAMUSCULAR; INTRAVENOUS; SUBCUTANEOUS EVERY 5 MIN PRN
Status: DISCONTINUED | OUTPATIENT
Start: 2024-05-16 | End: 2024-05-16 | Stop reason: HOSPADM

## 2024-05-16 RX ORDER — SODIUM CHLORIDE 9 MG/ML
INJECTION, SOLUTION INTRAVENOUS CONTINUOUS
Status: DISCONTINUED | OUTPATIENT
Start: 2024-05-16 | End: 2024-05-16 | Stop reason: HOSPADM

## 2024-05-16 RX ORDER — DEXAMETHASONE SODIUM PHOSPHATE 4 MG/ML
INJECTION, SOLUTION INTRA-ARTICULAR; INTRALESIONAL; INTRAMUSCULAR; INTRAVENOUS; SOFT TISSUE
Status: DISCONTINUED | OUTPATIENT
Start: 2024-05-16 | End: 2024-05-16

## 2024-05-16 RX ORDER — HYDROMORPHONE HYDROCHLORIDE 2 MG/ML
0.5 INJECTION, SOLUTION INTRAMUSCULAR; INTRAVENOUS; SUBCUTANEOUS EVERY 5 MIN PRN
Status: DISCONTINUED | OUTPATIENT
Start: 2024-05-16 | End: 2024-05-16 | Stop reason: HOSPADM

## 2024-05-16 RX ORDER — PROPOFOL 10 MG/ML
VIAL (ML) INTRAVENOUS
Status: DISCONTINUED | OUTPATIENT
Start: 2024-05-16 | End: 2024-05-16

## 2024-05-16 RX ORDER — HYDROCODONE BITARTRATE AND ACETAMINOPHEN 5; 325 MG/1; MG/1
1 TABLET ORAL EVERY 6 HOURS PRN
Qty: 28 TABLET | Refills: 0 | Status: SHIPPED | OUTPATIENT
Start: 2024-05-16 | End: 2024-05-23

## 2024-05-16 RX ORDER — HYDROCODONE BITARTRATE AND ACETAMINOPHEN 5; 325 MG/1; MG/1
1 TABLET ORAL EVERY 4 HOURS PRN
Status: CANCELLED | OUTPATIENT
Start: 2024-05-16

## 2024-05-16 RX ORDER — LIDOCAINE HYDROCHLORIDE 20 MG/ML
INJECTION, SOLUTION EPIDURAL; INFILTRATION; INTRACAUDAL; PERINEURAL
Status: DISCONTINUED | OUTPATIENT
Start: 2024-05-16 | End: 2024-05-16

## 2024-05-16 RX ORDER — IPRATROPIUM BROMIDE AND ALBUTEROL SULFATE 2.5; .5 MG/3ML; MG/3ML
3 SOLUTION RESPIRATORY (INHALATION) ONCE AS NEEDED
Status: DISCONTINUED | OUTPATIENT
Start: 2024-05-16 | End: 2024-05-16 | Stop reason: HOSPADM

## 2024-05-16 RX ORDER — ACETAMINOPHEN 500 MG
1000 TABLET ORAL EVERY 6 HOURS PRN
Status: CANCELLED | OUTPATIENT
Start: 2024-05-16

## 2024-05-16 RX ORDER — MEPERIDINE HYDROCHLORIDE 25 MG/ML
25 INJECTION INTRAMUSCULAR; INTRAVENOUS; SUBCUTANEOUS ONCE AS NEEDED
Status: DISCONTINUED | OUTPATIENT
Start: 2024-05-16 | End: 2024-05-16 | Stop reason: HOSPADM

## 2024-05-16 RX ORDER — FENTANYL CITRATE 50 UG/ML
INJECTION, SOLUTION INTRAMUSCULAR; INTRAVENOUS
Status: DISCONTINUED | OUTPATIENT
Start: 2024-05-16 | End: 2024-05-16

## 2024-05-16 RX ORDER — DIPHENHYDRAMINE HYDROCHLORIDE 50 MG/ML
25 INJECTION INTRAMUSCULAR; INTRAVENOUS EVERY 6 HOURS PRN
Status: DISCONTINUED | OUTPATIENT
Start: 2024-05-16 | End: 2024-05-16 | Stop reason: HOSPADM

## 2024-05-16 RX ORDER — PROMETHAZINE HYDROCHLORIDE 25 MG/1
25 TABLET ORAL EVERY 6 HOURS PRN
Status: CANCELLED | OUTPATIENT
Start: 2024-05-16

## 2024-05-16 RX ORDER — ONDANSETRON HYDROCHLORIDE 2 MG/ML
INJECTION, SOLUTION INTRAVENOUS
Status: DISCONTINUED | OUTPATIENT
Start: 2024-05-16 | End: 2024-05-16

## 2024-05-16 RX ORDER — ONDANSETRON 4 MG/1
8 TABLET, ORALLY DISINTEGRATING ORAL EVERY 8 HOURS PRN
Status: CANCELLED | OUTPATIENT
Start: 2024-05-16

## 2024-05-16 RX ORDER — HYDROCODONE BITARTRATE AND ACETAMINOPHEN 10; 325 MG/1; MG/1
1 TABLET ORAL EVERY 4 HOURS PRN
Status: CANCELLED | OUTPATIENT
Start: 2024-05-16

## 2024-05-16 RX ADMIN — FENTANYL CITRATE 100 MCG: 50 INJECTION INTRAMUSCULAR; INTRAVENOUS at 01:05

## 2024-05-16 RX ADMIN — SODIUM CHLORIDE: 9 INJECTION, SOLUTION INTRAVENOUS at 11:05

## 2024-05-16 RX ADMIN — EPHEDRINE SULFATE 25 MG: 50 INJECTION INTRAVENOUS at 02:05

## 2024-05-16 RX ADMIN — CEFAZOLIN 2 G: 2 INJECTION, POWDER, FOR SOLUTION INTRAMUSCULAR; INTRAVENOUS at 01:05

## 2024-05-16 RX ADMIN — ONDANSETRON 4 MG: 2 INJECTION INTRAMUSCULAR; INTRAVENOUS at 01:05

## 2024-05-16 RX ADMIN — DEXAMETHASONE SODIUM PHOSPHATE 8 MG: 4 INJECTION, SOLUTION INTRA-ARTICULAR; INTRALESIONAL; INTRAMUSCULAR; INTRAVENOUS; SOFT TISSUE at 01:05

## 2024-05-16 RX ADMIN — PROPOFOL 150 MG: 10 INJECTION, EMULSION INTRAVENOUS at 01:05

## 2024-05-16 RX ADMIN — LIDOCAINE HYDROCHLORIDE 50 MG: 20 INJECTION, SOLUTION INTRAVENOUS at 01:05

## 2024-05-16 RX ADMIN — SODIUM CHLORIDE: 9 INJECTION, SOLUTION INTRAVENOUS at 01:05

## 2024-05-16 NOTE — OR NURSING
1448 received pt from OR.pt resting with eyes closed. Oral airway in place    1500 pt resting quietly. Denies needs oral airway removed    1515 pt resting quietly. Denies needs    1524 pt released to jai Lamb RN  Family at bedside    116/66, 51, 16, 100%(room air)

## 2024-05-16 NOTE — ANESTHESIA POSTPROCEDURE EVALUATION
Anesthesia Post Evaluation    Patient: Vickey Dobbs    Procedure(s) Performed: Procedure(s) (LRB):  ARTHROSCOPY, KNEE (Left)  ARTHROSCOPY, KNEE, WITH DEBRIDEMENT OF MEDIAL CHONDYLE (Left)  ARTHROSCOPY, KNEE, WITH MEDIAL MENISCECTOMY (Left)    Final Anesthesia Type: general      Patient location during evaluation: PACU  Patient participation: Yes- Able to Participate  Level of consciousness: awake and alert and oriented  Post-procedure vital signs: reviewed and stable  Pain management: adequate  Airway patency: patent  KAREN mitigation strategies: Multimodal analgesia  PONV status at discharge: No PONV  Anesthetic complications: no      Cardiovascular status: hemodynamically stable  Respiratory status: unassisted and spontaneous ventilation  Hydration status: euvolemic  Follow-up not needed.              Vitals Value Taken Time   /69 05/16/24 1453   Temp 36.6 °C (97.9 °F) 05/16/24 1450   Pulse 55 05/16/24 1455   Resp 14 05/16/24 1455   SpO2 99 % 05/16/24 1455   Vitals shown include unfiled device data.      No case tracking events are documented in the log.      Pain/Zeus Score: No data recorded

## 2024-05-16 NOTE — BRIEF OP NOTE
"[facility] - Orthopedic Periop Services  Brief Operative Note    Surgery Date: 5/16/2024     Surgeons and Role:     * Alexx Griffith MD - Primary    Assisting Surgeon: None    Pre-op Diagnosis:  Acute medial meniscus tear of left knee, subsequent encounter [S83.242D]    Post-op Diagnosis:  Post-Op Diagnosis Codes:     * Acute medial meniscus tear of left knee, subsequent encounter [S83.242D]    Procedure(s) (LRB):  ARTHROSCOPY, KNEE (Left)  ARTHROSCOPY, KNEE, WITH DEBRIDEMENT OF MEDIAL CHONDYLE (Left)  ARTHROSCOPY, KNEE, WITH MEDIAL MENISCECTOMY (Left)    Anesthesia: General    Description of the findings of the procedure(s): See Op Note     Estimated Blood Loss: 3 mL         Specimens:   Specimen (24h ago, onward)      None              Discharge Note    OUTCOME: Patient tolerated treatment/procedure well without complication and is now ready for discharge.    DISPOSITION: Home or Self Care    FINAL DIAGNOSIS:  Acute medial meniscus tear of left knee    FOLLOWUP: In clinic    DISCHARGE INSTRUCTIONS:    Discharge Procedure Orders   CRUTCHES FOR HOME USE     Order Specific Question Answer Comments   Type: Axillary    Height: 5' 3" (1.6 m)    Weight: 74.4 kg (164 lb)    Length of need (1-99 months): 2      Diet general     Keep surgical extremity elevated     Ice to affected area   Order Comments: using barrier between ice and skin (specify duration&frequency)     Change dressing (specify)   Order Comments: Dressing change: one time per day beginning 72 hours post op.     Call MD for:  temperature >100.4     Call MD for:  persistent nausea and vomiting     Call MD for:  severe uncontrolled pain     Call MD for:  difficulty breathing, headache or visual disturbances     Call MD for:  redness, tenderness, or signs of infection (pain, swelling, redness, odor or green/yellow discharge around incision site)     Call MD for:  hives     Call MD for:  persistent dizziness or light-headedness     Call MD for:  " extreme fatigue     Remove dressing in 48 hours     Activity as tolerated     Weight bearing as tolerated

## 2024-05-16 NOTE — INTERVAL H&P NOTE
The patient has been examined and the H&P has been reviewed:    I concur with the findings and no changes have occurred since H&P was written.    Surgery risks, benefits and alternative options discussed and understood by patient/family.          Active Hospital Problems    Diagnosis  POA    *Acute medial meniscus tear of left knee [S88.763A]  Yes      Resolved Hospital Problems   No resolved problems to display.

## 2024-05-16 NOTE — OP NOTE
[facility] - [facility]  Surgery Department  Operative Note    SUMMARY     Date of Procedure: 5/16/2024     Procedure: Procedure(s) (LRB):  ARTHROSCOPY, KNEE (Left)  ARTHROSCOPY, KNEE, WITH DEBRIDEMENT OF MEDIAL CHONDYLE (Left)  ARTHROSCOPY, KNEE, WITH MEDIAL MENISCECTOMY (Left)     Surgeons and Role:     * Alexx Griffith MD - Primary    Assisting Surgeon: None    Pre-Operative Diagnosis: Acute medial meniscus tear of left knee, subsequent encounter [S83.242D]    Post-Operative Diagnosis: Post-Op Diagnosis Codes:     * Acute medial meniscus tear of left knee, subsequent encounter [S83.242D]    Anesthesia: General    Technical Procedures Used:            DEPARTMENT OF ORTHOPEDIC SURGERY                OPERATIVE REPORT     NAME:  Vickey Dobbs  MRN: [id number]               DATE OF SURGERY:  05/16/2024     PREOPERATIVE DIAGNOSIS:  left knee internal derangement     POSTOPERATIVE DIAGNOSIS:  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    PROCEDURE:  Examination under anesthesia, arthroscopy with intraarticular probing, shaving medial femoral condyle, partial medial meniscectomy    PROCEDURE IN DETAIL:  The patient was taken to the operating room and placed in the supine position.  After adequate level of general anesthesia had been achieved (See Anesthesia Note) patients left knee was examined.  There was 1+ intraarticular effusion.  Knee range of motion was 0-130 degrees of flexion.  Lachman exam was negative as is the FRD.  There is no medial or ligamentous instability appreciated.  Earnest test produced intermittent popping felt to emanate from the lateral aspect of the joint.  Now, the leftlower extremity was scrubbed with Betadine and draped in sterile fashion.  The left lower extremity was elevated, exsanguinated with a [place] bandage.  A pneumatic tourniquet about the upper thigh, to pressure of 300 millimeters of Mercury.   The operation was begun by making a small stab wound about the superolateral aspect of the right patella.  A trocar was introduced into the suprapatellar pouch and the knee was distended with sterile saline.  Second and third stab wounds were made about the medial and lateral aspects of the patellar tendon for introduction of the arthroscopy camera and intraarticular probe.  Now a systematic evaluation of the knee was carried out.  Inspection of the suprapatellar pouch was unremarkable.  Exam of the patellofemoral joint demonstrated normal cartilaginous surfaces and good tracking in the midline.  Lateral gutter was unremarkable with no loose bodies encountered.  Exam of the medial joint showed area of grade 2-3/4 DJD involving the weight-bearing articular surface of the medial femoral condyle.  This is debrided with a shaver to the extent possible.  Vertical tear of the posterior horn of the medial meniscus was present.  The torn portion of meniscus was excised and the edges were smoothed with a shaver.  Exam of the intercondylar notch revealed intact anterior and posterior cruciate ligaments.  Exam lateral joint showed normal femoral tibial cartilage and intact lateral meniscus to probing. Now, the tourniquet was let down.  Hemostasis was achieved with Bovie electrocautery.  Knee joint was irrigated copiously with antibiotic solution and arthroscope withdrawn.  The stab wounds were reapproximated with interrupted 4-0 suture.  The wounds were dressed sterilely.  The patient was taken to the recovery room in satisfactory condition.  ESTIMATED BLOOD LOSS:  5ccs.   Tourniquet time: 18 minutes.  The patient received Ancef antibiotic intravenously prior to the procedure.      Alexx Griffith     Description of the Findings of the Procedure:  Grade 2-3/4 DJD medial femoral condyle articular surface, degenerative tear posterior horn medial meniscus    Significant Surgical Tasks Conducted by the Assistant(s), if  Applicable:     Complications: No    Estimated Blood Loss (EBL): 3 mL           Implants: * No implants in log *    Specimens:   Specimen (24h ago, onward)      None                    Condition: Good    Disposition: PACU - hemodynamically stable.    Attestation: I was present and scrubbed for the entire procedure.

## 2024-05-16 NOTE — SUBJECTIVE & OBJECTIVE
Past Medical History:   Diagnosis Date    Adenomatous polyp of transverse colon 6/23/2022    Arthritis     Cancer     Colon, diverticulosis 6/23/2022    GERD (gastroesophageal reflux disease)     High cholesterol     History of colon polyps 6/23/2022    Hypertension     Polyp of rectum 6/23/2022    Prostate cancer     Screening for colon cancer 6/23/2022       Past Surgical History:   Procedure Laterality Date    PROSTATE SURGERY         Review of patient's allergies indicates:  No Known Allergies    No current facility-administered medications for this encounter.     Current Outpatient Medications   Medication Sig    atenoloL (TENORMIN) 50 MG tablet Take 50 mg by mouth once daily.    atorvastatin (LIPITOR) 40 MG tablet Take 40 mg by mouth once daily.    fenofibrate 160 MG Tab Take 145 mg by mouth once daily.    losartan-hydrochlorothiazide 100-12.5 mg (HYZAAR) 100-12.5 mg Tab Take 1 tablet by mouth once daily.    omeprazole (PRILOSEC) 40 MG capsule Take 40 mg by mouth once daily.     Family History    None       Tobacco Use    Smoking status: Never    Smokeless tobacco: Current     Types: Chew   Substance and Sexual Activity    Alcohol use: Never    Drug use: Never    Sexual activity: Not on file     Review of Systems   Constitutional: Negative.     Objective:     Vital Signs (Most Recent):    Vital Signs (24h Range):  BP: ()/()   Arterial Line BP: ()/()            There is no height or weight on file to calculate BMI.    No intake or output data in the 24 hours ending 05/15/24 2242     General    Vitals reviewed.  Constitutional: He is oriented to person, place, and time. He appears well-developed and well-nourished.   HENT:   Head: Normocephalic and atraumatic.   Eyes: EOM are normal. Pupils are equal, round, and reactive to light.   Neck: Neck supple.   Pulmonary/Chest: Effort normal and breath sounds normal.   Abdominal: Soft. Bowel sounds are normal.   Neurological: He is alert and oriented to person, place,  and time.   Psychiatric: He has a normal mood and affect. His behavior is normal.     General Musculoskeletal Exam   Gait: antalgic       Right Knee Exam   Right knee exam is normal.    Left Knee Exam     Inspection   Effusion: present    Tenderness   The patient tender to palpation of the medial joint line.    Range of Motion   The patient has normal left knee ROM.  Extension:  normal   Flexion:  normal     Tests   Meniscus   Earnest:  Medial - positive   Stability   Lachman: normal (-1 to 2mm)   MCL - Valgus: normal (0 to 2mm)  LCL - Varus: normal (0 to 2mm)  Pivot Shift: normal (Equal)    Other   Sensation: normal       Significant Labs: All pertinent labs within the past 24 hours have been reviewed.    Significant Imaging: I have reviewed all pertinent imaging results/findings.

## 2024-05-16 NOTE — ANESTHESIA PROCEDURE NOTES
Intubation    Date/Time: 5/16/2024 1:44 PM    Performed by: Victoriano Valdez CRNA  Authorized by: Christiano Suárez DO    Intubation:     Induction:  Intravenous    Intubated:  Postinduction    Mask Ventilation:  Easy mask    Attempts:  1    Attempted By:  CRNA    Method of Intubation:  Direct    Difficult Airway Encountered?: No      Complications:  None    Airway Device:  Supraglottic airway/LMA    Airway Device Size:  4.0    Style/Cuff Inflation:  Cuffed (inflated to minimal occlusive pressure)    Placement Verified By:  Capnometry    Complicating Factors:  None    Findings Post-Intubation:  BS equal bilateral and atraumatic/condition of teeth unchanged

## 2024-05-16 NOTE — ANESTHESIA PREPROCEDURE EVALUATION
05/16/2024  Vickey Dobbs is a 68 y.o., male.      Pre-op Assessment    I have reviewed the Patient Summary Reports.     I have reviewed the Nursing Notes. I have reviewed the NPO Status.   I have reviewed the Medications.     Review of Systems  Anesthesia Hx:  No problems with previous Anesthesia             Denies Family Hx of Anesthesia complications.    Denies Personal Hx of Anesthesia complications.                    Social:  Smoker, No Alcohol Use       Cardiovascular:  Exercise tolerance: good   Hypertension           hyperlipidemia   ECG has been reviewed.                          Renal/:  Chronic Renal Disease, CKD                Hepatic/GI:     GERD             Musculoskeletal:     Acute medial meniscus tear of left knee, subsequent encounter [S82.370J]                Physical Exam  General: Well nourished, Cooperative and Alert    Airway:  Mallampati: II   Mouth Opening: Normal  TM Distance: Normal  Tongue: Normal  Neck ROM: Normal ROM    Dental:  Intact    Chest/Lungs:  Clear to auscultation, Normal Respiratory Rate    Heart:  Rate: Normal  Rhythm: Regular Rhythm        Chemistry        Component Value Date/Time     05/14/2024 1224    K 4.0 05/14/2024 1224     (H) 05/14/2024 1224    CO2 27 05/14/2024 1224    BUN 13 05/14/2024 1224    CREATININE 1.32 (H) 05/14/2024 1224    GLU 78 05/14/2024 1224        Component Value Date/Time    CALCIUM 10.6 (H) 05/14/2024 1224    ALKPHOS 46 11/01/2021 0829    AST 33 11/01/2021 0829    ALT 54 11/01/2021 0829    BILITOT 0.5 11/01/2021 0829    ESTGFRAFRICA 70 11/01/2021 0829    EGFRNONAA 58 (L) 01/03/2022 0820        Lab Results   Component Value Date    WBC 6.66 05/14/2024    HGB 13.1 (L) 05/14/2024    HCT 41.7 05/14/2024     05/14/2024     No results found for this or any previous visit.      Anesthesia Plan  Type of Anesthesia, risks &  benefits discussed:    Anesthesia Type: Gen Supraglottic Airway  Intra-op Monitoring Plan: Standard ASA Monitors  Post Op Pain Control Plan: multimodal analgesia  Induction:  IV  Airway Plan: Direct, Post-Induction  Informed Consent: Informed consent signed with the Patient and all parties understand the risks and agree with anesthesia plan.  All questions answered.   ASA Score: 2  Day of Surgery Review of History & Physical: H&P Update referred to the surgeon/provider.I have interviewed and examined the patient. I have reviewed the patient's H&P dated: There are no significant changes.     Ready For Surgery From Anesthesia Perspective.     .

## 2024-05-16 NOTE — DISCHARGE SUMMARY
"[facility] - Orthopedic Periop Services  Discharge Note  Short Stay    Procedure(s) (LRB):  ARTHROSCOPY, KNEE (Left)  ARTHROSCOPY, KNEE, WITH DEBRIDEMENT OF MEDIAL CHONDYLE (Left)  ARTHROSCOPY, KNEE, WITH MEDIAL MENISCECTOMY (Left)      OUTCOME: Patient tolerated treatment/procedure well without complication and is now ready for discharge.    DISPOSITION: Home or Self Care    FINAL DIAGNOSIS:  Acute medial meniscus tear of left knee    FOLLOWUP: In clinic    DISCHARGE INSTRUCTIONS:    Discharge Procedure Orders   CRUTCHES FOR HOME USE     Order Specific Question Answer Comments   Type: Axillary    Height: 5' 3" (1.6 m)    Weight: 74.4 kg (164 lb)    Length of need (1-99 months): 2      Diet general     Keep surgical extremity elevated     Ice to affected area   Order Comments: using barrier between ice and skin (specify duration&frequency)     Change dressing (specify)   Order Comments: Dressing change: one time per day beginning 72 hours post op.     Call MD for:  temperature >100.4     Call MD for:  persistent nausea and vomiting     Call MD for:  severe uncontrolled pain     Call MD for:  difficulty breathing, headache or visual disturbances     Call MD for:  redness, tenderness, or signs of infection (pain, swelling, redness, odor or green/yellow discharge around incision site)     Call MD for:  hives     Call MD for:  persistent dizziness or light-headedness     Call MD for:  extreme fatigue     Remove dressing in 48 hours     Activity as tolerated     Weight bearing as tolerated        TIME SPENT ON DISCHARGE: 15 minutes  "

## 2024-05-16 NOTE — HPI
Chief complaint:  Left knee pain   History:   Vickey Dobbs is a 68 y.o. male seen for recheck of his left knee.  Symptoms began acutely in September of 2023.  He developed medial knee pain.  He was seen initially in October of 2023 prior to obtaining an MRI examination.  MRI examination of the left knee was obtained in November of 2023 showing complex tear of the posterior horn of the medial meniscus.  He was offered left knee arthroscopy but was at that time scheduled for foot surgery in [facility].  Delayed consideration of knee arthroscopy in order to proceed with his foot surgery.  She was returning at this time requesting consideration of knee arthroscopy due to persistent medial knee pain.  He continues to ambulate independently however.  We have discussed the knee arthroscopy procedure as an outpatient under general anesthesia.  The potential benefits and risks of surgery were outlined to include but not limited to bleeding infection, damage to blood vessels and nerves, need for further surgery, other risks and complications including even death the patient wished to proceed.  Limitations of arthroscopy relative to potential DJD were emphasized.   Impression:  Internal derangement-left knee   Plan: Left knee arthroscopy.  Outpatient general anesthesia.  Potential benefits and risks surgery outlined to include but not limited to bleeding, infection, damage to blood vessels and nerves, need for further surgery, other risks and complications including even death the patient wished to proceed.  Limitations of arthroscopy relative to DJD were emphasized

## 2024-05-16 NOTE — TRANSFER OF CARE
"Anesthesia Transfer of Care Note    Patient: Vickey Dobbs    Procedure(s) Performed: Procedure(s) (LRB):  ARTHROSCOPY, KNEE (Left)  ARTHROSCOPY, KNEE, WITH DEBRIDEMENT OF MEDIAL CHONDYLE (Left)  ARTHROSCOPY, KNEE, WITH MEDIAL MENISCECTOMY (Left)    Patient location: PACU    Anesthesia Type: general    Transport from OR: Transported from OR on 6-10 L/min O2 by face mask with adequate spontaneous ventilation    Post pain: adequate analgesia    Post assessment: no apparent anesthetic complications    Post vital signs: stable    Level of consciousness: responds to stimulation, awake and sedated    Nausea/Vomiting: no nausea/vomiting    Complications: none    Transfer of care protocol was followed    Last vitals: Visit Vitals  /64 (BP Location: Right arm, Patient Position: Lying)   Pulse (!) 58   Temp 36.6 °C (97.9 °F) (Oral)   Resp 17   Ht 5' 3" (1.6 m)   Wt 74.4 kg (164 lb)   SpO2 99%   BMI 29.05 kg/m²     "

## 2024-05-16 NOTE — H&P
[facility] - Orthopedic Periop Services  Orthopedics  H&P    Patient Name: Vickey Dobbs  MRN: [id number]  Admission Date: (Not on file)  Primary Care Provider: Troy Guerrero MD    Patient information was obtained from patient and ER records.     Subjective:     Principal Problem:Acute medial meniscus tear of left knee    Chief Complaint: No chief complaint on file.       HPI: Chief complaint:  Left knee pain   History:   Vickey Dobbs is a 68 y.o. male seen for recheck of his left knee.  Symptoms began acutely in September of 2023.  He developed medial knee pain.  He was seen initially in October of 2023 prior to obtaining an MRI examination.  MRI examination of the left knee was obtained in November of 2023 showing complex tear of the posterior horn of the medial meniscus.  He was offered left knee arthroscopy but was at that time scheduled for foot surgery in [facility].  Delayed consideration of knee arthroscopy in order to proceed with his foot surgery.  She was returning at this time requesting consideration of knee arthroscopy due to persistent medial knee pain.  He continues to ambulate independently however.  We have discussed the knee arthroscopy procedure as an outpatient under general anesthesia.  The potential benefits and risks of surgery were outlined to include but not limited to bleeding infection, damage to blood vessels and nerves, need for further surgery, other risks and complications including even death the patient wished to proceed.  Limitations of arthroscopy relative to potential DJD were emphasized.   Impression:  Internal derangement-left knee   Plan: Left knee arthroscopy.  Outpatient general anesthesia.  Potential benefits and risks surgery outlined to include but not limited to bleeding, infection, damage to blood vessels and nerves, need for further surgery, other risks and complications including even death the patient wished to proceed.  Limitations of  arthroscopy relative to DJD were emphasized    Past Medical History:   Diagnosis Date    Adenomatous polyp of transverse colon 6/23/2022    Arthritis     Cancer     Colon, diverticulosis 6/23/2022    GERD (gastroesophageal reflux disease)     High cholesterol     History of colon polyps 6/23/2022    Hypertension     Polyp of rectum 6/23/2022    Prostate cancer     Screening for colon cancer 6/23/2022       Past Surgical History:   Procedure Laterality Date    PROSTATE SURGERY         Review of patient's allergies indicates:  No Known Allergies    No current facility-administered medications for this encounter.     Current Outpatient Medications   Medication Sig    atenoloL (TENORMIN) 50 MG tablet Take 50 mg by mouth once daily.    atorvastatin (LIPITOR) 40 MG tablet Take 40 mg by mouth once daily.    fenofibrate 160 MG Tab Take 145 mg by mouth once daily.    losartan-hydrochlorothiazide 100-12.5 mg (HYZAAR) 100-12.5 mg Tab Take 1 tablet by mouth once daily.    omeprazole (PRILOSEC) 40 MG capsule Take 40 mg by mouth once daily.     Family History    None       Tobacco Use    Smoking status: Never    Smokeless tobacco: Current     Types: Chew   Substance and Sexual Activity    Alcohol use: Never    Drug use: Never    Sexual activity: Not on file     Review of Systems   Constitutional: Negative.     Objective:     Vital Signs (Most Recent):    Vital Signs (24h Range):  BP: ()/()   Arterial Line BP: ()/()            There is no height or weight on file to calculate BMI.    No intake or output data in the 24 hours ending 05/15/24 2242     General    Vitals reviewed.  Constitutional: He is oriented to person, place, and time. He appears well-developed and well-nourished.   HENT:   Head: Normocephalic and atraumatic.   Eyes: EOM are normal. Pupils are equal, round, and reactive to light.   Neck: Neck supple.   Pulmonary/Chest: Effort normal and breath sounds normal.   Abdominal: Soft. Bowel sounds are normal.    Neurological: He is alert and oriented to person, place, and time.   Psychiatric: He has a normal mood and affect. His behavior is normal.     General Musculoskeletal Exam   Gait: antalgic       Right Knee Exam   Right knee exam is normal.    Left Knee Exam     Inspection   Effusion: present    Tenderness   The patient tender to palpation of the medial joint line.    Range of Motion   The patient has normal left knee ROM.  Extension:  normal   Flexion:  normal     Tests   Meniscus   Earnest:  Medial - positive   Stability   Lachman: normal (-1 to 2mm)   MCL - Valgus: normal (0 to 2mm)  LCL - Varus: normal (0 to 2mm)  Pivot Shift: normal (Equal)    Other   Sensation: normal       Significant Labs: All pertinent labs within the past 24 hours have been reviewed.    Significant Imaging: I have reviewed all pertinent imaging results/findings.  Assessment/Plan:     No notes have been filed under this hospital service.  Service: Orthopedic Surgery      Alexx Griffith MD  Orthopedics  [facility] - [facility]opedic [facility]iop Services

## 2024-05-17 NOTE — PT/OT/SLP EVAL
Physical Therapy Evaluation    Patient Name:  Vickey Dobbs   MRN:  [id number]    Recommendations:     Discharge Recommendations: No Therapy Indicated   Discharge Equipment Recommendations: none   Barriers to discharge: None    Assessment:     Vickey Dobbs is a 68 y.o. male admitted with a medical diagnosis of Acute medial meniscus tear of left knee.  He presents with the following impairments/functional limitations: decreased ROM Patient with good use of crutches pwb.    Rehab Prognosis: Good; patient would benefit from acute skilled PT services to address these deficits and reach maximum level of function.    Recent Surgery: Procedure(s) (LRB):  ARTHROSCOPY, KNEE (Left)  ARTHROSCOPY, KNEE, WITH DEBRIDEMENT OF MEDIAL CHONDYLE (Left)  ARTHROSCOPY, KNEE, WITH MEDIAL MENISCECTOMY (Left) Day of Surgery    Plan:     During this hospitalization, patient to be seen 1 x/week to address the identified rehab impairments via therapeutic activities, gait training and progress toward the following goals:    Plan of Care Expires:  05/16/24    Subjective     Chief Complaint: post op pain  Patient/Family Comments/goals: plan is dc home today  Pain/Comfort:  Pain Rating 1: 4/10  Location - Side 1: Left  Location 1: knee    Patients cultural, spiritual, Congregation conflicts given the current situation: no    Living Environment:  Lives with family  Prior to admission, patients level of function was independent.  Equipment used at home: none.  DME owned (not currently used): none.  Upon discharge, patient will have assistance from family.    Objective:     Communicated with nurse prior to session.  Patient found supine with    upon PT entry to room.    General Precautions: Standard, fall  Orthopedic Precautions:LLE weight bearing as tolerated   Braces:    Respiratory Status: Room air    Exams:  na    Functional Mobility:  Gait: ambulated 20 feet with crutches wbat      AM-PAC 6 CLICK MOBILITY  Total Score:24       Treatment  & Education:  Hep ascope protocol    Patient left supine with all lines intact.    GOALS:   Multidisciplinary Problems       Physical Therapy Goals       Not on file                    History:     Past Medical History:   Diagnosis Date    Adenomatous polyp of transverse colon 6/23/2022    Arthritis     Cancer     Colon, diverticulosis 6/23/2022    GERD (gastroesophageal reflux disease)     High cholesterol     History of colon polyps 6/23/2022    Hypertension     Polyp of rectum 6/23/2022    Prostate cancer     Screening for colon cancer 6/23/2022       Past Surgical History:   Procedure Laterality Date    PROSTATE SURGERY         Time Tracking:     PT Received On: 05/16/24  PT Start Time: 1620     PT Stop Time: 1640  PT Total Time (min): 20 min     Billable Minutes: Evaluation 20      05/16/2024

## 2024-05-23 ENCOUNTER — OFFICE VISIT (OUTPATIENT)
Dept: ORTHOPEDICS | Facility: CLINIC | Age: 69
End: 2024-05-23
Payer: MEDICARE

## 2024-05-23 VITALS — HEIGHT: 63 IN | BODY MASS INDEX: 29.06 KG/M2 | WEIGHT: 164 LBS

## 2024-05-23 DIAGNOSIS — Z98.890 STATUS POST ARTHROSCOPY OF LEFT KNEE: Primary | ICD-10-CM

## 2024-05-23 PROCEDURE — 99024 POSTOP FOLLOW-UP VISIT: CPT | Mod: ,,,

## 2024-05-23 PROCEDURE — 99213 OFFICE O/P EST LOW 20 MIN: CPT | Mod: PBBFAC

## 2024-05-23 NOTE — PROGRESS NOTES
Arthroscopy, Knee - Left, Arthroscopy, Knee, With Debridement Of Medial Chondyle - Left, and Arthroscopy, Knee, With Medial Meniscectomy - Left 5/16/2024    Vickey Dobbs is a 68 y.o. male seen today for post-op visit.  Patient is 2 weeks status post left knee arthroscopy with medial meniscectomy by Dr. Griffith.  Patient is doing well.  No complaints today.      PAST MEDICAL HISTORY:   Past Medical History:   Diagnosis Date    Adenomatous polyp of transverse colon 6/23/2022    Arthritis     Cancer     Colon, diverticulosis 6/23/2022    GERD (gastroesophageal reflux disease)     High cholesterol     History of colon polyps 6/23/2022    Hypertension     Polyp of rectum 6/23/2022    Prostate cancer     Screening for colon cancer 6/23/2022        PAST SURGICAL HISTORY:   Past Surgical History:   Procedure Laterality Date    ARTHROSCOPY OF KNEE Left 5/16/2024    Procedure: ARTHROSCOPY, KNEE;  Surgeon: Alexx Griffith MD;  Location: [facility];  Service: Orthopedics;  Laterality: Left;    KNEE ARTHROSCOPY W/ DEBRIDEMENT Left 5/16/2024    Procedure: ARTHROSCOPY, KNEE, WITH DEBRIDEMENT OF MEDIAL CHONDYLE;  Surgeon: Alexx Griffith MD;  Location: [facility] OR;  Service: Orthopedics;  Laterality: Left;    KNEE ARTHROSCOPY W/ MENISCECTOMY Left 5/16/2024    Procedure: ARTHROSCOPY, KNEE, WITH MEDIAL MENISCECTOMY;  Surgeon: Alexx Griffith MD;  Location: [facility];  Service: Orthopedics;  Laterality: Left;    PROSTATE SURGERY          MEDICATIONS:   Current Outpatient Medications:     atenoloL (TENORMIN) 50 MG tablet, Take 50 mg by mouth once daily., Disp: , Rfl:     atorvastatin (LIPITOR) 40 MG tablet, Take 40 mg by mouth once daily., Disp: , Rfl:     fenofibrate 160 MG Tab, Take 145 mg by mouth once daily., Disp: , Rfl:     HYDROcodone-acetaminophen (NORCO) 5-325 mg per tablet, Take 1 tablet by mouth every 6 (six) hours as needed for Pain., Disp: 28 tablet, Rfl: 0     losartan-hydrochlorothiazide 100-12.5 mg (HYZAAR) 100-12.5 mg Tab, Take 1 tablet by mouth once daily., Disp: , Rfl:     omeprazole (PRILOSEC) 40 MG capsule, Take 40 mg by mouth once daily., Disp: , Rfl:        PHYSICAL EXAM:      GENERAL: Well-developed, well-nourished male . The patient is alert, oriented and cooperative.    EXTREMITIES:  Left knee with skin clean dry and intact, minimal swelling and erythema around incision site, good range of motion from 0-100 degrees without pain, knee is stable to varus and valgus stress testing, neurovascularly intact    WOUND: Incisions are clean,dry,intact without signs of infection and healing well      RADIOGRAPHIC FINDINGS:   No results found.     Patient Active Problem List    Diagnosis Date Noted    Acute medial meniscus tear of left knee 11/27/2023    Screening for colon cancer 06/23/2022    History of colon polyps 06/23/2022    Colon, diverticulosis 06/23/2022    Adenomatous polyp of transverse colon 06/23/2022    Polyp of rectum 06/23/2022     IMPRESSION AND PLAN: Patient is status-post Arthroscopy, Knee - Left, Arthroscopy, Knee, With Debridement Of Medial Chondyle - Left, and Arthroscopy, Knee, With Medial Meniscectomy - Left doing well.  All incisional sutures removed today and replaced with Steri-Strips, discussed that these can get wet in the shower in 2-3 days, let them fall off on their own.  Discussed continuation of gentle range-of-motion exercises.  Discussed continuation of weight-bearing as tolerated.  Follow up with Dr. Griffith in 4 weeks, sooner as needed.      No follow-ups on file.       Whitney Rothman PA-C      (Subject to voice recognition error, transcription service not allowed)

## 2025-04-28 ENCOUNTER — TELEPHONE (OUTPATIENT)
Dept: GASTROENTEROLOGY | Facility: CLINIC | Age: 70
End: 2025-04-28
Payer: MEDICARE

## 2025-04-28 DIAGNOSIS — Z86.0100 HISTORY OF COLON POLYPS: Primary | ICD-10-CM

## 2025-08-07 ENCOUNTER — ANESTHESIA EVENT (OUTPATIENT)
Dept: GASTROENTEROLOGY | Facility: HOSPITAL | Age: 70
End: 2025-08-07
Payer: MEDICARE

## 2025-08-07 ENCOUNTER — ANESTHESIA (OUTPATIENT)
Dept: GASTROENTEROLOGY | Facility: HOSPITAL | Age: 70
End: 2025-08-07
Payer: MEDICARE

## 2025-08-07 ENCOUNTER — HOSPITAL ENCOUNTER (OUTPATIENT)
Dept: GASTROENTEROLOGY | Facility: HOSPITAL | Age: 70
Discharge: HOME | End: 2025-08-07
Attending: INTERNAL MEDICINE | Admitting: INTERNAL MEDICINE
Payer: MEDICARE

## 2025-08-07 VITALS
OXYGEN SATURATION: 100 % | HEART RATE: 45 BPM | SYSTOLIC BLOOD PRESSURE: 130 MMHG | HEIGHT: 63 IN | BODY MASS INDEX: 28.7 KG/M2 | DIASTOLIC BLOOD PRESSURE: 75 MMHG | RESPIRATION RATE: 18 BRPM | TEMPERATURE: 97 F | WEIGHT: 162 LBS

## 2025-08-07 DIAGNOSIS — Z12.11 SCREENING FOR MALIGNANT NEOPLASM OF COLON: Primary | ICD-10-CM

## 2025-08-07 DIAGNOSIS — K57.30 DIVERTICULOSIS OF COLON: ICD-10-CM

## 2025-08-07 DIAGNOSIS — Z86.0100 HISTORY OF COLON POLYPS: ICD-10-CM

## 2025-08-07 PROCEDURE — 27000284 HC CANNULA NASAL

## 2025-08-07 PROCEDURE — 63600175 PHARM REV CODE 636 W HCPCS

## 2025-08-07 PROCEDURE — 37000009 HC ANESTHESIA EA ADD 15 MINS

## 2025-08-07 PROCEDURE — 25000003 PHARM REV CODE 250

## 2025-08-07 PROCEDURE — 37000008 HC ANESTHESIA 1ST 15 MINUTES

## 2025-08-07 RX ORDER — PROPOFOL 10 MG/ML
VIAL (ML) INTRAVENOUS
Status: DISCONTINUED | OUTPATIENT
Start: 2025-08-07 | End: 2025-08-07

## 2025-08-07 RX ORDER — LIDOCAINE HYDROCHLORIDE 20 MG/ML
INJECTION, SOLUTION EPIDURAL; INFILTRATION; INTRACAUDAL; PERINEURAL
Status: DISCONTINUED | OUTPATIENT
Start: 2025-08-07 | End: 2025-08-07

## 2025-08-07 RX ORDER — SODIUM CHLORIDE, SODIUM LACTATE, POTASSIUM CHLORIDE, CALCIUM CHLORIDE 600; 310; 30; 20 MG/100ML; MG/100ML; MG/100ML; MG/100ML
INJECTION, SOLUTION INTRAVENOUS CONTINUOUS
Status: DISCONTINUED | OUTPATIENT
Start: 2025-08-07 | End: 2025-08-08 | Stop reason: HOSPADM

## 2025-08-07 RX ORDER — SODIUM CHLORIDE 0.9 % (FLUSH) 0.9 %
10 SYRINGE (ML) INJECTION EVERY 6 HOURS PRN
Status: DISCONTINUED | OUTPATIENT
Start: 2025-08-07 | End: 2025-08-08 | Stop reason: HOSPADM

## 2025-08-07 RX ADMIN — PROPOFOL 10 MG: 10 INJECTION, EMULSION INTRAVENOUS at 12:08

## 2025-08-07 RX ADMIN — PROPOFOL 50 MG: 10 INJECTION, EMULSION INTRAVENOUS at 12:08

## 2025-08-07 RX ADMIN — PROPOFOL 20 MG: 10 INJECTION, EMULSION INTRAVENOUS at 12:08

## 2025-08-07 RX ADMIN — LIDOCAINE HYDROCHLORIDE 50 MG: 20 INJECTION, SOLUTION EPIDURAL; INFILTRATION; INTRACAUDAL; PERINEURAL at 12:08

## 2025-08-07 RX ADMIN — SODIUM CHLORIDE: 9 INJECTION, SOLUTION INTRAVENOUS at 12:08

## 2025-08-07 NOTE — TRANSFER OF CARE
"Anesthesia Transfer of Care Note    Patient: Vickey Dobbs    Procedure(s) Performed: * Colonoscopy *    Patient location: GI    Anesthesia Type: MAC    Transport from OR: Transported from OR on room air with adequate spontaneous ventilation. Continuous ECG monitoring in transport. Continuous SpO2 monitoring in transport    Post pain: adequate analgesia    Post assessment: no apparent anesthetic complications    Post vital signs: stable    Level of consciousness: sedated and responds to stimulation    Nausea/Vomiting: no nausea/vomiting    Complications: none    Transfer of care protocol was followedComments: Good SV continue, NAD, VSS, RTRN      Last vitals: Visit Vitals  BP (!) 93/54 (BP Location: Right arm, Patient Position: Lying)   Pulse (!) 54   Temp 36.1 °C (97 °F) (Skin)   Resp 16   Ht 5' 3" (1.6 m)   Wt 73.5 kg (162 lb)   SpO2 100%   BMI 28.70 kg/m²     "

## 2025-08-07 NOTE — ANESTHESIA POSTPROCEDURE EVALUATION
Anesthesia Post Evaluation    Patient: Vickey Dobbs    Procedure(s) Performed: * Colonoscopy     Final Anesthesia Type: MAC      Patient location during evaluation: GI PACU  Patient participation: Yes- Able to Participate  Level of consciousness: awake and alert  Post-procedure vital signs: reviewed and stable  Pain management: adequate  Airway patency: patent    PONV status at discharge: No PONV  Anesthetic complications: no      Cardiovascular status: blood pressure returned to baseline and hemodynamically stable  Respiratory status: unassisted, spontaneous ventilation and room air  Hydration status: euvolemic  Follow-up not needed.  Comments: Refer to nursing notes for pain/cecilio score upon discharge from recovery.              Vitals Value Taken Time   /47 08/07/25 12:39   Temp 36.1 °C (97 °F) 08/07/25 12:23   Pulse 45 08/07/25 12:42   Resp 19 08/07/25 12:42   SpO2 99 % 08/07/25 12:42   Vitals shown include unfiled device data.      No case tracking events are documented in the log.      Pain/Cecilio Score: Cecilio Score: 9 (8/7/2025 12:26 PM)

## 2025-08-07 NOTE — H&P
[facility] - Endoscopy  Gastroenterology  H&P    Patient Name: Vickey Dobbs  MRN: [id number]  Admission Date: 8/7/2025  Code Status: Prior    Attending Provider: Sky Hicks MD   Primary Care Physician: Troy Guerrero MD  Principal Problem:<principal problem not specified>    Subjective:     History of Present Illness:  This patient is a 69-year-old man with history of colon polyps.  His last colonoscopy was in 2021.    Past Medical History:   Diagnosis Date    Adenomatous polyp of transverse colon 6/23/2022    Arthritis     Cancer     Colon, diverticulosis 6/23/2022    GERD (gastroesophageal reflux disease)     High cholesterol     History of colon polyps 6/23/2022    Hypertension     Polyp of rectum 6/23/2022    Prostate cancer     Screening for colon cancer 6/23/2022       Past Surgical History:   Procedure Laterality Date    ARTHROSCOPY OF KNEE Left 5/16/2024    Procedure: ARTHROSCOPY, KNEE;  Surgeon: Alexx Griffith MD;  Location: [facility];  Service: Orthopedics;  Laterality: Left;    KNEE ARTHROSCOPY W/ DEBRIDEMENT Left 5/16/2024    Procedure: ARTHROSCOPY, KNEE, WITH DEBRIDEMENT OF MEDIAL CHONDYLE;  Surgeon: Alexx Griffith MD;  Location: [facility];  Service: Orthopedics;  Laterality: Left;    KNEE ARTHROSCOPY W/ MENISCECTOMY Left 5/16/2024    Procedure: ARTHROSCOPY, KNEE, WITH MEDIAL MENISCECTOMY;  Surgeon: Alexx Griffith MD;  Location: [facility];  Service: Orthopedics;  Laterality: Left;    PROSTATE SURGERY         Review of patient's allergies indicates:  No Known Allergies  Family History    None       Tobacco Use    Smoking status: Never    Smokeless tobacco: Current     Types: Chew   Substance and Sexual Activity    Alcohol use: Never    Drug use: Never    Sexual activity: Not on file     Review of Systems   Constitutional: Negative.    HENT: Negative.     Respiratory: Negative.     Cardiovascular: Negative.    Gastrointestinal: Negative.   "    Objective:     Vital Signs (Most Recent):  Temp: 98.1 °F (36.7 °C) (08/07/25 1104)  Pulse: (!) 50 (08/07/25 1104)  Resp: 16 (08/07/25 1104)  BP: 138/76 (08/07/25 1104)  SpO2: 100 % (08/07/25 1104) Vital Signs (24h Range):  Temp:  [98.1 °F (36.7 °C)] 98.1 °F (36.7 °C)  Pulse:  [50] 50  Resp:  [16] 16  SpO2:  [100 %] 100 %  BP: (138)/(76) 138/76     Weight: 73.5 kg (162 lb) (08/07/25 1104)  Body mass index is 28.7 kg/m².    No intake or output data in the 24 hours ending 08/07/25 1204    Lines/Drains/Airways       Peripheral Intravenous Line  Duration             Peripheral IV 08/07/25 1104 22 G Anterior;Left Hand <1 day                    Physical Exam  Vitals reviewed.   Constitutional:       General: He is not in acute distress.     Appearance: Normal appearance. He is well-developed. He is not ill-appearing.   HENT:      Head: Normocephalic and atraumatic.      Nose: Nose normal.   Eyes:      Pupils: Pupils are equal, round, and reactive to light.   Cardiovascular:      Rate and Rhythm: Normal rate and regular rhythm.   Pulmonary:      Effort: Pulmonary effort is normal.      Breath sounds: Normal breath sounds. No wheezing.   Abdominal:      General: Abdomen is flat. Bowel sounds are normal. There is no distension.      Palpations: Abdomen is soft.      Tenderness: There is no abdominal tenderness. There is no guarding.   Skin:     General: Skin is warm and dry.      Coloration: Skin is not jaundiced.   Neurological:      Mental Status: He is alert.   Psychiatric:         Attention and Perception: Attention normal.         Mood and Affect: Affect normal.         Speech: Speech normal.         Behavior: Behavior is cooperative.      Comments: Pt was calm while speaking.         Significant Labs:  CBC: No results for input(s): "WBC", "HGB", "HCT", "PLT" in the last 48 hours.  CMP: No results for input(s): "GLU", "CALCIUM", "ALBUMIN", "PROT", "NA", "K", "CO2", "CL", "BUN", "CREATININE", "ALKPHOS", "ALT", "AST", " ""BILITOT" in the last 48 hours.    Significant Imaging:  Imaging results within the past 24 hours have been reviewed.    Assessment/Plan:     There are no hospital problems to display for this patient.        Impression: History of colon polyps, surveillance colonoscopy is scheduled for today  Plan: Colonoscopy today    Sky Hicks MD  Gastroenterology  [facility] - Endoscopy  "

## 2025-08-07 NOTE — DISCHARGE INSTRUCTIONS
Procedure Date  8/7/25     Impression  Overall Impression:   Diverticulosis in the ascending colon, descending colon and sigmoid colon  Rectal exam revealed no mass.  The colon was examined from the rectum to the cecum and no polyps were seen.  Scattered pandiverticulosis was present.  Grade 1 internal hemorrhoids were present.     Recommendation  Repeat colonoscopy in 5 years      Disposition: Discharge patient to home in stable condition.  High-fiber diet.  No driving until tomorrow.  Follow up with PCP as scheduled, return to GI clinic p.r.n.    No driving today, no operating heavy machinery, no signing any legal documents until tomorrow.    Drink lots of fluids, resume regular diet.  Take your normal medications.     Please call our office for any nausea, vomiting or abdominal pain.

## 2025-08-07 NOTE — ANESTHESIA PREPROCEDURE EVALUATION
08/07/2025  Vickey Dobbs is a 69 y.o., male.  Outpatient Medications as of 8/7/2025   Medication Sig Dispense Refill    atenoloL (TENORMIN) 50 MG tablet Take 50 mg by mouth once daily.      atorvastatin (LIPITOR) 40 MG tablet Take 40 mg by mouth once daily.      fenofibrate 160 MG Tab Take 145 mg by mouth once daily.      losartan-hydrochlorothiazide 100-12.5 mg (HYZAAR) 100-12.5 mg Tab Take 1 tablet by mouth once daily.      omeprazole (PRILOSEC) 40 MG capsule Take 40 mg by mouth once daily.       No current facility-administered medications on file as of 8/7/2025.     Social History     Socioeconomic History    Marital status:    Tobacco Use    Smoking status: Never    Smokeless tobacco: Current     Types: Chew   Substance and Sexual Activity    Alcohol use: Never    Drug use: Never     Social Drivers of Health     Financial Resource Strain: Not at Risk (5/15/2025)    Received from [employer]    Financial Resource Strain     How hard is it for you to pay for the very basics like food, housing, heating, medical care, and medications?: 1   Food Insecurity: Not on File (9/26/2024)    Received from [employer]    Food Insecurity     Food: 0   Transportation Needs: Not at Risk (5/15/2025)    Received from [employer]    Transportation Needs     In the past 12 months, has lack of transportation kept you from medical appointments, meetings, work or from getting things needed for daily living?: 1   Physical Activity: Not on File (6/10/2022)    Received from [employer]    Physical Activity     Physical Activity: 0   Stress: Not at Risk (5/15/2025)    Received from [employer]    Stress     Do you feel these kinds of stress these days?: 1   Housing Stability: Not at Risk (5/15/2025)    Received from [employer]    Housing Stability     What is your living situation today? : 1     Active Ambulatory Problems     Diagnosis Date Noted     Screening for colon cancer 06/23/2022    History of colon polyps 06/23/2022    Colon, diverticulosis 06/23/2022    Adenomatous polyp of transverse colon 06/23/2022    Polyp of rectum 06/23/2022    Acute medial meniscus tear of left knee 11/27/2023     Resolved Ambulatory Problems     Diagnosis Date Noted    No Resolved Ambulatory Problems     Past Medical History:   Diagnosis Date    Arthritis     Cancer     GERD (gastroesophageal reflux disease)     High cholesterol     Hypertension     Prostate cancer      Past Surgical History:   Procedure Laterality Date    ARTHROSCOPY OF KNEE Left 5/16/2024    Procedure: ARTHROSCOPY, KNEE;  Surgeon: Alexx Griffith MD;  Location: [facility] OR;  Service: Orthopedics;  Laterality: Left;    KNEE ARTHROSCOPY W/ DEBRIDEMENT Left 5/16/2024    Procedure: ARTHROSCOPY, KNEE, WITH DEBRIDEMENT OF MEDIAL CHONDYLE;  Surgeon: Alexx Griffith MD;  Location: [facility] OR;  Service: Orthopedics;  Laterality: Left;    KNEE ARTHROSCOPY W/ MENISCECTOMY Left 5/16/2024    Procedure: ARTHROSCOPY, KNEE, WITH MEDIAL MENISCECTOMY;  Surgeon: Alexx Griffith MD;  Location: [facility] OR;  Service: Orthopedics;  Laterality: Left;    PROSTATE SURGERY         Chemistry        Component Value Date/Time     05/14/2024 1224    K 4.0 05/14/2024 1224     (H) 05/14/2024 1224    CO2 27 05/14/2024 1224    BUN 13 05/14/2024 1224    CREATININE 1.32 (H) 05/14/2024 1224    GLU 78 05/14/2024 1224        Component Value Date/Time    CALCIUM 10.6 (H) 05/14/2024 1224    ALKPHOS 46 11/01/2021 0829    AST 33 11/01/2021 0829    ALT 54 11/01/2021 0829    BILITOT 0.5 11/01/2021 0829    ESTGFRAFRICA 70 11/01/2021 0829    EGFRNONAA 58 (L) 01/03/2022 0820          Lab Results   Component Value Date    WBC 6.66 05/14/2024    HGB 13.1 (L) 05/14/2024    HCT 41.7 05/14/2024    MCV 94.3 05/14/2024     05/14/2024         Pre-op Assessment    I have reviewed the Patient Summary Reports.      I have reviewed the Nursing Notes. I have reviewed the NPO Status.   I have reviewed the Medications.     Review of Systems  Anesthesia Hx:  No problems with previous Anesthesia             Denies Family Hx of Anesthesia complications.    Denies Personal Hx of Anesthesia complications.                    Social:  Dip/Chew       Hematology/Oncology:  Hematology Normal                       --  Cancer in past history (prostate):              surgery       EENT/Dental:  EENT/Dental Normal           Cardiovascular:  Exercise tolerance: good   Hypertension           hyperlipidemia                               Pulmonary:  Pulmonary Normal                       Renal/:  Chronic Renal Disease, CKD                Hepatic/GI:  Bowel Prep.   GERD                Musculoskeletal:  Arthritis               Neurological:  Neurology Normal                                      Endocrine:  Endocrine Normal            Dermatological:  Skin Normal    Psych:  Psychiatric Normal                    Physical Exam  General: Well nourished, Cooperative, Alert and Oriented    Airway:  Mallampati: II   Mouth Opening: Normal  TM Distance: Normal  Tongue: Normal  Neck ROM: Normal ROM    Dental:  Intact    Chest/Lungs:  Normal Respiratory Rate        Anesthesia Plan  Type of Anesthesia, risks & benefits discussed:    Anesthesia Type: MAC  Intra-op Monitoring Plan: Standard ASA Monitors  Post Op Pain Control Plan: multimodal analgesia  Induction:  IV  Informed Consent: Informed consent signed with the Patient and all parties understand the risks and agree with anesthesia plan.  All questions answered. Patient consented to blood products? Yes  ASA Score: 2  Day of Surgery Review of History & Physical: H&P Update referred to the surgeon/provider.I have interviewed and examined the patient. I have reviewed the patient's H&P dated: There are no significant changes.     Ready For Surgery From Anesthesia Perspective.     .

## (undated) DEVICE — SOL NACL IRR 3000ML

## (undated) DEVICE — GLOVE SENSICARE PI SURG 6.5

## (undated) DEVICE — TUBING CROSSFLOW INFLOW CASS

## (undated) DEVICE — CANISTER SUCTION MEDI-VAC 12L

## (undated) DEVICE — TOURNIQUET SB QC SP 34X4IN

## (undated) DEVICE — BANDAGE SURE-WRAP 6INX5YD

## (undated) DEVICE — PAD ABDOMINAL 8X7.5 STERILE

## (undated) DEVICE — GLOVE SENSICARE PI GRN 7

## (undated) DEVICE — GLOVE SENSICARE PI GRN 6.5

## (undated) DEVICE — GLOVE SENSICARE PI SURG 7.5

## (undated) DEVICE — PACK KNEE ARTHROSCOPY  RUSH

## (undated) DEVICE — GOWN POLY REINF BRTH SLV XL

## (undated) DEVICE — APPLICATOR CHLORAPREP ORN 26ML

## (undated) DEVICE — PROBE AARDVARK SUC 0.5X135MM

## (undated) DEVICE — SHAVER TOMCAT 4.0